# Patient Record
Sex: FEMALE | Race: WHITE
[De-identification: names, ages, dates, MRNs, and addresses within clinical notes are randomized per-mention and may not be internally consistent; named-entity substitution may affect disease eponyms.]

---

## 2017-07-14 ENCOUNTER — HOSPITAL ENCOUNTER (OUTPATIENT)
Dept: HOSPITAL 62 - RAD | Age: 32
End: 2017-07-14
Attending: PHYSICIAN ASSISTANT
Payer: OTHER GOVERNMENT

## 2017-07-14 DIAGNOSIS — R10.10: Primary | ICD-10-CM

## 2017-07-14 PROCEDURE — 76700 US EXAM ABDOM COMPLETE: CPT

## 2017-07-14 NOTE — RADIOLOGY REPORT (SQ)
EXAM DESCRIPTION:  U/S ABDOMEN COMPLETE W/O DOP



COMPLETED DATE/TIME:  7/14/2017 9:38 am



REASON FOR STUDY:  UPPER ABDOMINAL PAIN R10.10  UPPER ABDOMINAL PAIN, UNSPECIFIED



COMPARISON:  None.



TECHNIQUE:  Dynamic and static grayscale images acquired of the abdomen and recorded on PACS. Additio
mary selected color Doppler and spectral images recorded.



LIMITATIONS:  None.



FINDINGS:  PANCREAS: No masses. Visualized pancreatic duct normal caliber.

LIVER: 13.8 cm.  Normal echotexture.

LIVER VASCULATURE: Normal directional flow of the main portal vein and hepatic veins.

GALLBLADDER: No stones. Normal wall thickness. No pericholecystic fluid.

ULTRASOUND-DETECTED RUTH'S SIGN: Negative.

INTRAHEPATIC DUCTS AND COMMON DUCT: Common bile duct is normal at 1.4 mm.  There is no intrahepatic d
uctal dilatation

INFERIOR VENA CAVA: Normal flow.

AORTA: No aneurysm.

RIGHT KIDNEY:  Normal size, 10.8 cm.   Normal echogenicity.   No solid or suspicious masses.   No hyd
ronephrosis.   No calcifications.

LEFT KIDNEY:  Normal size, 10.6 cm.   Normal echogenicity.   No solid or suspicious masses.   No hydr
onephrosis.   No calcifications.

SPLEEN: Normal size, 8.4 cm.  No masses.

PERITONEAL AND PLEURAL SPACES: No ascites or effusions.

OTHER: No other significant finding.



IMPRESSION:  NORMAL ABDOMINAL ULTRASOUND.



TECHNICAL DOCUMENTATION:  JOB ID:  [id number]

 [employer]- All Rights Reserved

## 2017-10-31 ENCOUNTER — HOSPITAL ENCOUNTER (OUTPATIENT)
Dept: HOSPITAL 62 - RAD | Age: 32
End: 2017-10-31
Attending: PHYSICIAN ASSISTANT
Payer: OTHER GOVERNMENT

## 2017-10-31 DIAGNOSIS — R10.9: Primary | ICD-10-CM

## 2017-10-31 PROCEDURE — A9537 TC99M MEBROFENIN: HCPCS

## 2017-10-31 PROCEDURE — 78227 HEPATOBIL SYST IMAGE W/DRUG: CPT

## 2017-10-31 NOTE — RADIOLOGY REPORT (SQ)
EXAM DESCRIPTION:  NM HIDA SCAN WITH CCK



COMPLETED DATE/TIME:  10/31/2017 3:07 pm



REASON FOR STUDY:  UNSPECIFIED ABDOMINAL PAIN (R10.9) R10.9  UNSPECIFIED ABDOMINAL PAIN



COMPARISON:  None.



RADIONUCLIDE AND DOSE:  DOSAGE RADIONUCLIDE: 5 millicuries Tc99m Mebrofenin.

DOSAGE CCK: 1.1 micrograms.

DOSAGE MORPHINE: Not required.

The route of agent administration: Intravenous



TECHNIQUE:  Serial imaging right upper quadrant up to 60 minutes following injection of radionuclide.
  CCK injected after gallbladder visualized.



LIMITATIONS:  None.



FINDINGS:  LIVER: Normal visualization without areas of photopenia.

INTRAHEPATIC BILE DUCTS: Normal size and no delay in visualization.

COMMON BILE DUCT: Normal without dilatation.

GALLBLADDER:   Normal visualization.  Calculated ejection fraction of 19%. Normal range is greater th
an 35%.

PHYSICAL RESPONSE:  Patients presenting complaint was reproduced.

OTHER: No other significant finding.



IMPRESSION:  Reduce gallbladder ejection fraction of 19% where 35% or greater is considered normal.  
Administration of CCK reproduced the patient's symptoms.



TECHNICAL DOCUMENTATION:  JOB ID:  [id number]

 2011 [employer]- All Rights Reserved

## 2018-10-05 ENCOUNTER — HOSPITAL ENCOUNTER (EMERGENCY)
Dept: HOSPITAL 62 - ER | Age: 33
Discharge: HOME | End: 2018-10-05
Payer: OTHER GOVERNMENT

## 2018-10-05 VITALS — DIASTOLIC BLOOD PRESSURE: 65 MMHG | SYSTOLIC BLOOD PRESSURE: 95 MMHG
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
## 2018-10-05 DIAGNOSIS — S00.251A: Primary | ICD-10-CM
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
## 2018-10-05 PROCEDURE — 90715 TDAP VACCINE 7 YRS/> IM: CPT

## 2018-10-05 PROCEDURE — 99283 EMERGENCY DEPT VISIT LOW MDM: CPT

## 2018-10-05 PROCEDURE — 90471 IMMUNIZATION ADMIN: CPT

## 2018-10-05 PROCEDURE — 10120 INC&RMVL FB SUBQ TISS SMPL: CPT

## 2018-10-05 NOTE — ER DOCUMENT REPORT
ED General





- General


Chief Complaint: Foreign Body


Stated Complaint: FISHHOOK IN EYEBROW


Time Seen by Provider: 10/05/18 19:08


Notes: 





Patient is a 33-year-old female who presents with a fishhook to the left 

eyebrow.  Her splint was freshwater fishing and when he was casting had 

punctured her eyebrow.  Her last tetanus shot was 6 years ago.


TRAVEL OUTSIDE OF THE [place] IN LAST 30 DAYS: No





- Related Data


Allergies/Adverse Reactions: 


 





No Known Allergies Allergy (Verified 09/25/17 16:00)


 











Past Medical History





- General


Information source: Patient





- Social History


Smoking Status: Never Smoker


Chew tobacco use (# tins/day): No


Frequency of alcohol use: Occasional


Drug Abuse: None


Family History: Reviewed & Not Pertinent


Patient has suicidal ideation: No


Patient has homicidal ideation: No





- Past Medical History


Cardiac Medical History: 


   Denies: Hx Coronary Artery Disease, Hx Heart Attack, Hx Hypertension


Pulmonary Medical History: Reports: Hx Bronchitis


   Denies: Hx Asthma, Hx COPD, Hx Pneumonia


Neurological Medical History: Denies: Hx Cerebrovascular Accident, Hx Seizures


Renal/ Medical History: Denies: Hx Peritoneal Dialysis


Musculoskeletal Medical History: Denies Hx Arthritis


Past Surgical History: Denies: Hx Hysterectomy





Review of Systems





- Review of Systems


Notes: 





Constitutional: Negative for fever.


HENT: Positive for pain at left eyebrow, with fishhook and eyebrow


Eyes: Negative for visual changes.


Cardiovascular: Negative for chest pain.


Respiratory: Negative for shortness of breath.


Gastrointestinal: Negative for abdominal pain, vomiting or diarrhea.


Genitourinary: Negative for dysuria.


Musculoskeletal: Negative for back pain.


Skin: Negative for rash.


Neurological: Negative for headaches, weakness or numbness.





10 point ROS negative except as marked above and in HPI.





Physical Exam





- Vital signs


Vitals: 


 











Temp Pulse Resp BP Pulse Ox


 


 98.6 F   69   18   99/67 L  100 


 


 10/05/18 18:30  10/05/18 18:30  10/05/18 18:30  10/05/18 18:30  10/05/18 18:30














- Notes


Notes: 





PHYSICAL EXAMINATION:





GENERAL: Well-appearing, well-nourished and in no acute distress.





HEAD: Atraumatic, normocephalic, fishhook in the left eyebrow.





EYES: Pupils equal round and reactive to light, extraocular movements intact, 

sclera anicteric, conjunctiva are normal.





ENT: nares patent, oropharynx clear without exudates.  Moist mucous membranes.





NECK: Normal range of motion, supple without lymphadenopathy





LUNGS: Breath sounds clear to auscultation bilaterally and equal.  No wheezes 

rales or rhonchi.





HEART: Regular rate and rhythm without murmurs





ABDOMEN: Soft, nontender, normoactive bowel sounds.  No guarding, no rebound.  

No masses appreciated.





EXTREMITIES: Normal range of motion, no pitting or edema.  No cyanosis.





NEUROLOGICAL: No focal neurological deficits. Moves all extremities 

spontaneously and on command.





PSYCH: Normal mood, normal affect.





SKIN: Warm, Dry, normal turgor, no rashes or lesions noted. 





Course





- Re-evaluation


Re-evalutation: 





10/05/18 2002 


Patient has a fishhook to left eyebrow, the hook is not exposed. 


10/05/18 21:08


[place] was removed by TANGELA Larry.  Hook was advanced to expose the tip, 

then the hook was cut with pliers.  The hook was then removed.





- Vital Signs


Vital signs: 


 











Temp Pulse Resp BP Pulse Ox


 


 98.6 F   69   18   99/67 L  100 


 


 10/05/18 18:30  10/05/18 18:30  10/05/18 18:30  10/05/18 18:30  10/05/18 18:30














Procedures





- Incision and Drainage


  ** [place] removal of left eyebrow


Type: Simple


Anesthetic type: 1% Lidocaine w/epi


mL's of anesthetic: 2


I&D procedure: Chlorprep applied, Shurclens applied


Incision Method: Incision made with needle - Fish hook was advanced to expose 

the tip, then pliers were used to cut the tip.  The hook was then pulled out.





Discharge





- Discharge


Clinical Impression: 


 Foreign body (FB) in soft tissue





Condition: Stable


Disposition: HOME, SELF-CARE


Additional Instructions: 


You have been seen in the emergency department for a fish hook removal to left 

eyebrow.  You may see at the site of the fishhook removal.  Keep the area clean 

and dry.  You may clean the area as needed with soap and water.  In addition, 

you have been prescribed antibiotics.  Please finish your antibiotics as 

prescribed.  If you feel the site is getting worse, develop a fever, see some 

increased redness, or have any concerns that are worrisome to you, please 

return to the emergency department or visit your primary care provider.


Prescriptions: 


Doxycycline Monohydrate 100 mg PO BID #14 tablet


Referrals: 


AUTUMN HENLEY PA-C [Primary Care Provider] - Follow up as needed

## 2018-10-27 ENCOUNTER — HOSPITAL ENCOUNTER (OUTPATIENT)
Dept: HOSPITAL 62 - OD | Age: 33
End: 2018-10-27
Attending: NURSE PRACTITIONER
Payer: OTHER GOVERNMENT

## 2018-10-27 DIAGNOSIS — N91.2: Primary | ICD-10-CM

## 2018-10-27 PROCEDURE — 36415 COLL VENOUS BLD VENIPUNCTURE: CPT

## 2018-10-27 PROCEDURE — 84703 CHORIONIC GONADOTROPIN ASSAY: CPT

## 2018-11-30 ENCOUNTER — HOSPITAL ENCOUNTER (OUTPATIENT)
Dept: HOSPITAL 62 - RAD | Age: 33
End: 2018-11-30
Attending: PHYSICIAN ASSISTANT
Payer: OTHER GOVERNMENT

## 2018-11-30 DIAGNOSIS — R10.2: Primary | ICD-10-CM

## 2018-11-30 PROCEDURE — 76830 TRANSVAGINAL US NON-OB: CPT

## 2018-11-30 NOTE — RADIOLOGY REPORT (SQ)
EXAM DESCRIPTION:  U/S NON-OB PELVIS TV W/O DOP



COMPLETED DATE/TIME:  11/30/2018 9:17 am



REASON FOR STUDY:  PELVIC PAIN IN FEMALE. (R10.2) R10.2  PELVIC AND PERINEAL PAIN



COMPARISON:  None.



TECHNIQUE:  Dynamic and static grayscale images acquired of the pelvis via transvaginal approach and 
recorded on PACS. Additional selected color Doppler and spectral images recorded.



LIMITATIONS:  None.



FINDINGS:  UTERUS: Contour normal.  No mass.

ENDOMETRIAL STRIPE: No focal or generalized thickening. No masses.  There is a small amount of fluid 
noted within the endometrial canal.

CERVIX: Small nabothian cysts are demonstrated.

RIGHT OVARY AND DOPPLER: There is a 1.7 x 2.1 x 1.2 cm complex ovarian cyst.  Normal intravascular fl
ow.

LEFT OVARY AND DOPPLER: Normal size. No worrisome masses. Normal arterial vascular flow without evide
nce for torsion.

FREE FLUID: None noted.

OTHER: No other significant finding.

MEASUREMENTS:

UTERUS: 7.1 x 5.4 x 3.5 cm.

ENDOMETRIAL STRIPE: 10 mm.

RIGHT OVARY: 3.7 x 3.9 x 1.7 cm.

LEFT OVARY: 3.3 x 2.7 x 1.7 cm.



IMPRESSION:  Small complex right ovarian lesion measured 1.7 x 2.1 x 1.2 cm.  No free fluid.  No othe
r significant findings.



TECHNICAL DOCUMENTATION:  JOB ID:  [id number]

 2011 [employer]- All Rights Reserved                          Rev-5/18



Reading location - IP/workstation name: [email or web address]

## 2023-01-24 ENCOUNTER — OFFICE VISIT (OUTPATIENT)
Dept: FAMILY MEDICINE CLINIC | Age: 38
End: 2023-01-24
Payer: OTHER GOVERNMENT

## 2023-01-24 VITALS
SYSTOLIC BLOOD PRESSURE: 104 MMHG | RESPIRATION RATE: 18 BRPM | HEIGHT: 62 IN | TEMPERATURE: 97.6 F | WEIGHT: 150 LBS | BODY MASS INDEX: 27.6 KG/M2 | HEART RATE: 78 BPM | OXYGEN SATURATION: 99 % | DIASTOLIC BLOOD PRESSURE: 70 MMHG

## 2023-01-24 DIAGNOSIS — F41.9 ANXIETY: ICD-10-CM

## 2023-01-24 DIAGNOSIS — F32.A DEPRESSION, UNSPECIFIED DEPRESSION TYPE: ICD-10-CM

## 2023-01-24 DIAGNOSIS — Z76.89 ENCOUNTER TO ESTABLISH CARE: ICD-10-CM

## 2023-01-24 DIAGNOSIS — G43.909 MIGRAINE WITHOUT STATUS MIGRAINOSUS, NOT INTRACTABLE, UNSPECIFIED MIGRAINE TYPE: Primary | ICD-10-CM

## 2023-01-24 DIAGNOSIS — R55 VASOVAGAL EPISODE: ICD-10-CM

## 2023-01-24 DIAGNOSIS — G90.A POTS (POSTURAL ORTHOSTATIC TACHYCARDIA SYNDROME): ICD-10-CM

## 2023-01-24 PROCEDURE — 1220F PT SCREENED FOR DEPRESSION: CPT | Performed by: STUDENT IN AN ORGANIZED HEALTH CARE EDUCATION/TRAINING PROGRAM

## 2023-01-24 PROCEDURE — 99204 OFFICE O/P NEW MOD 45 MIN: CPT | Performed by: STUDENT IN AN ORGANIZED HEALTH CARE EDUCATION/TRAINING PROGRAM

## 2023-01-24 RX ORDER — ONDANSETRON 4 MG/1
4 TABLET, FILM COATED ORAL
COMMUNITY

## 2023-01-24 RX ORDER — FLUTICASONE PROPIONATE 50 MCG
2 SPRAY, SUSPENSION (ML) NASAL AS NEEDED
COMMUNITY
Start: 2022-10-19

## 2023-01-24 RX ORDER — TOPIRAMATE 50 MG/1
50 TABLET, FILM COATED ORAL DAILY
COMMUNITY
Start: 2022-12-16

## 2023-01-24 RX ORDER — AMITRIPTYLINE HYDROCHLORIDE 10 MG/1
10 TABLET, FILM COATED ORAL
COMMUNITY

## 2023-01-24 RX ORDER — METOPROLOL SUCCINATE 25 MG/1
25 TABLET, EXTENDED RELEASE ORAL DAILY
Qty: 90 TABLET | Refills: 1 | Status: SHIPPED | OUTPATIENT
Start: 2023-01-24

## 2023-01-24 RX ORDER — BUPROPION HYDROCHLORIDE 300 MG/1
300 TABLET ORAL DAILY
Qty: 90 TABLET | Refills: 1 | Status: SHIPPED | OUTPATIENT
Start: 2023-01-24

## 2023-01-24 RX ORDER — BUPROPION HYDROCHLORIDE 300 MG/1
300 TABLET ORAL DAILY
COMMUNITY
Start: 2023-01-12 | End: 2023-01-24 | Stop reason: SDUPTHER

## 2023-01-24 RX ORDER — METOPROLOL SUCCINATE 25 MG/1
25 TABLET, EXTENDED RELEASE ORAL DAILY
COMMUNITY
Start: 2022-12-21 | End: 2023-01-24 | Stop reason: SDUPTHER

## 2023-01-24 RX ORDER — MULTIVITAMIN
1 TABLET ORAL DAILY
COMMUNITY

## 2023-01-24 RX ORDER — TOPIRAMATE 100 MG/1
100 TABLET, FILM COATED ORAL DAILY
COMMUNITY
Start: 2022-11-09

## 2023-01-24 NOTE — PROGRESS NOTES
1. \"Have you been to the ER, urgent care clinic since your last visit? Hospitalized since your last visit? \" No    2. \"Have you seen or consulted any other health care providers outside of the [address] [place] [address] since your last visit? \" No     3. For patients aged 39-70: Has the patient had a colonoscopy / FIT/ Cologuard? NA - based on age      If the patient is female:    4. For patients aged 41-77: Has the patient had a mammogram within the past 2 years? NA - based on age or sex      11. For patients aged 21-65: Has the patient had a pap smear? Yes - Care Gap present.  Rooming MA/LPN to request most recent results previous PCP  [facility] - [place], [place]     Health Maintenance Due   Topic Date Due    Hepatitis C Screening  Never done    Depression Screen  Never done    COVID-19 Vaccine (1) Never done    DTaP/Tdap/Td series (1 - Tdap) Never done    Cervical cancer screen  Never done    Flu Vaccine (1) Never done

## 2023-01-24 NOTE — PROGRESS NOTES
Note written with assistance of voice recognition software. Chief Complaint   Patient presents with    Establish Care       History of Present Illness:  Dennis Devi is a 42 y.o. female w/ PMHx of migraines, POTS, vaso-vagal syncope, depression who presents to clinic to establish care. - Recently moved to [facility] from [facility]. Migraines: Previously followed with neurology. Would like a referral to a local neurologist. Reports that she gets migraine headaches about 3 times per week. Ran out of [employer] (seemed to be helping). - Takes Topamax.  - Reports receives yearly MRI of the head and spine to monitor multiple lesions of uncertain etiology. - Reports has some chronic back pain and paresthesias. - No fevers, occasional chronic urinary incontinence (hx 3 vaginal deliveries), no stool incontinence. No saddle anesthesia. POTS/vaso-vagal syncope: Diagnosed years ago (~2018). Improved metoprolol, self monitoring, adequate hydration. No longer followed by Cardiology. Takes metoprolol 25 mg daily.    - Had PAP 3 yrs ago per pt (performed by prior PCP). - Had mammogram last year per pt (demonstrated dense tissue). Depression: Pt takes Wellbutrin 300 mg XL daily and Elavil 10 mg QHS PRN. PHQ-9 score 11. No SI.  - Reports symptoms same as prior to moving to area. - Recently graduated from grad school.  - Currently at home taking care of 3 kids. 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 - No hx seizures. Past Medical History:   Diagnosis Date    Anemia     Anemic during all my pregnancies and then when I got sick and started passing out my white blood counts were high and i was sent to hematology to have my levels checked. I had to receive IV iron for months until my levels were normal again. Autoimmune disease ([facility] [address]     Chronic pain     Depression     More anxiety than depressionI do not like to be in crowds of people or like changes.  I am bery particular about things and have to do things a certain way. But, I am also so tired all of the time. Headache     POTS (postural orthostatic tachycardia syndrome)            No Known Allergies    Social History     Tobacco Use    Smoking status: Former     Packs/day: 0.25     Years: 9.00     Pack years: 2.25     Types: Cigarettes     Quit date:      Years since quittin.0     Passive exposure: Current    Smokeless tobacco: Never    Tobacco comments:     Light smoker   Substance Use Topics    Alcohol use: Yes     Alcohol/week: 2.0 standard drinks     Types: 1 Glasses of wine, 1 Standard drinks or equivalent per week     Comment: Maybe 1-2 drinks per week with dinner on the weekend. Drug use: Never       Family History   Problem Relation Age of Onset    Hypertension Mother     Migraines Mother     Hypertension Father     Cancer Maternal Grandfather         Prostate Cancer    Cancer Maternal Grandmother         Non hodgkins lymphoma/lung cancer    Anemia Maternal Grandmother     Cancer Paternal Grandmother         Lung Cancer       Physical Exam:     Visit Vitals  /70 (BP 1 Location: Left upper arm, BP Patient Position: Sitting, BP Cuff Size: Adult)   Pulse 78   Temp 97.6 °F (36.4 °C) (Oral)   Resp 18   Ht 5' 2\" (1.575 m)   Wt 150 lb (68 kg)   SpO2 99%   BMI 27.44 kg/m²       Physical Examination:  General: NAD  CV: Heart: Regular rate and rhythm. Normal S1 and S2. No murmurs. Resp: Breathing comfortably on room air. Good air movement. No wheezing. Neuro: Awake, alert, and appropriately conversant. Cranial nerves II to XII grossly intact. Motor strength and sensation grossly intact throughout. Assessment/Plan:    Diagnoses and all orders for this visit:    1. Migraine without status migrainosus, not intractable, unspecified migraine type: Chronic per history. Previously followed with neurology prior to moving to [place] 1 month ago. She reports experiencing up to 3 migraines per week. She takes Topamax daily.   She desires to establish care with a neurologist in this area. Provided referral (prefers Nicole saunders). -     REFERRAL TO NEUROLOGY    2. Depression and anxiety: Chronic. PHQ-9 score 11 today. Denies SI. Currently taking Wellbutrin 300 mg daily and Elavil 10 mg nightly as needed. Patient states that she recently moved to the area 1 month ago and is no longer working as a . She denies worsening depression, stating \"I feel the same. \"  No hx of seizures. Extensively discussed pharmacotherapy including continuing Wellbutrin or switching to a different antidepressant. Also recommended establishing care with a therapist in the area. After discussion and shared decision making, patient elected to continue Wellbutrin 300 mg daily and Elavil as needed at bedtime. Provided referral to psychology (prefers Nicole saunders). Recommended follow-up in 1 month. Encourage patient to seek medical attention immediately if there are any changes in the meantime.  -     REFERRAL TO PSYCHOLOGY  -     LA PATIENT SCREENED DEPRESSION  -     buPROPion XL (WELLBUTRIN XL) 300 mg XL tablet; Take 1 Tablet by mouth daily. 3. POTS (postural orthostatic tachycardia syndrome): Chronic, controlled with metoprolol 25 mg daily. No longer follows with cardiology. Provided refill of patient's metoprolol today. -     metoprolol succinate (TOPROL-XL) 25 mg XL tablet; Take 1 Tablet by mouth daily. 4. Vasovagal episode: Reports history of multiple vasovagal syncopal episodes in the past.  Improved. No longer follows with cardiology. Continue to promote lifestyle interventions, including adequate hydration, etc.    5. Encounter to establish care: Reviewed and updated PMHx, PSHx, Medications, Allergies, Soc Hx, Fam Hx. Records request form completed. Will request records from prior PCP. Declined flu shot. Follow-up and Dispositions    Return in about 1 month (around 2/24/2023) for follow-up anxiety/depression.          Lucy Bowman Josesito Barone expressed understanding of this plan. An AVS was printed and given to the patient. Pt discussed with Dr. Daren Salazar (Attending Physician),    Rosa Dillard MD  Family Medicine Resident    Please note that this dictation was completed with [employer], the computer voice recognition software. Quite often unanticipated grammatical, syntax, homophones, and other interpretive errors are inadvertently transcribed by the computer software. Please disregard these errors. Please excuse any errors that have escaped final proofreading.

## 2023-01-26 ENCOUNTER — PATIENT MESSAGE (OUTPATIENT)
Dept: FAMILY MEDICINE CLINIC | Age: 38
End: 2023-01-26

## 2023-02-16 ENCOUNTER — PATIENT MESSAGE (OUTPATIENT)
Dept: FAMILY MEDICINE CLINIC | Age: 38
End: 2023-02-16

## 2023-02-23 ENCOUNTER — PATIENT MESSAGE (OUTPATIENT)
Dept: FAMILY MEDICINE CLINIC | Age: 38
End: 2023-02-23

## 2023-02-26 NOTE — PROGRESS NOTES
CC: Follow-up. History of Present Illness:  Evens Laurent is a 42 y.o. female w/ PMHx of migraines, POTS, vaso-vagal syncope, depression who presents to clinic for    - Recently moved to area from [place]. Brain lesion of uncertain etiology: Previously followed with neurology in [place].    - Last MRI brain with and without contrast obtained on 10/10/2022, which showed \"no acute findings or significant change from previous study (3/2/2018). Grossly subtle small nonenhancing T2/FLAIR hyperintense cortical lesion inferior left temporal occipital region. Chronic stability (just over 4 years) is certainly reassuring, although, a low-grade neoplasm again cannot be excluded. Continued surveillance imaging in 1-2 years recommended. \"  - Pt reports that she has similar-appearing lesions in her spinal cord. Has been working to get CD of her spinal MRI. - Referred to neurologist at last visit. - Has not been able to get in with Neurologist.     Aurelia Sewell: Previously followed with neurology. Referred to neurologist at last visit. Reports that she gets migraine headaches about 3 times per week. Ran out of Aimovig (seemed to be helping). No history of seizures. - Reports symptoms once per week. - Takes Topamax.  - Takes Nurtec PRN (typically takes once a month). - Aimovig monthly seemed to help the most.  - Needs to see a neurologist to get Grace Billing approved. Chronic back pain and paresthesias. - Pain has been exacerbated this week. Pain located lower T spine, upper L spine. Pain sometimes radiates to hip. Reports intermittent tingling in arms/legs. Reports some increased tripping, clumsiness. No saddle anaesthesia, no bowel incontinence, no fevers, no trauma, no personal hx of cancer.  - Takes Tylenol PRN, ibuprofen PRN. - Has been doing stretching exercises. - Reports she had an MRI of her spine that showed bulging discs and spinal cord lesions. POTS/vaso-vagal syncope: Diagnosed years ago (~2018).  Improved metoprolol, self monitoring, adequate hydration. No longer followed by Cardiology. Takes metoprolol 25 mg daily.     - Had PAP 3 yrs ago per pt (performed by prior PCP). - Had mammogram last year per pt (demonstrated dense tissue). Depression: Pt takes Wellbutrin 300 mg XL daily and Elavil 10 mg QHS PRN. PHQ-9 score 8. No SI.  SAIRA-7 score 7.  - Reports symptoms same as prior to moving to area. - Recently graduated from grad school.  - Currently at home taking care of 3 kids. 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 - Has contacted office in [place]. Has not had appointment yet. - Pt reports home life is good and her kids have been doing well. Past Medical History:   Diagnosis Date    Anemia     Anemic during all my pregnancies and then when I got sick and started passing out my white blood counts were high and i was sent to hematology to have my levels checked. I had to receive IV iron for months until my levels were normal again. Autoimmune disease ([facility] [address]     Chronic pain     Depression     More anxiety than depressionI do not like to be in crowds of people or like changes. I am bery particular about things and have to do things a certain way. But, I am also so tired all of the time. Headache     POTS (postural orthostatic tachycardia syndrome)        Current Outpatient Medications   Medication Sig Dispense Refill    fluticasone propionate (FLONASE) 50 mcg/actuation nasal spray 2 Sprays by Both Nostrils route as needed. ondansetron hcl (ZOFRAN) 4 mg tablet Take 4 mg by mouth every eight (8) hours as needed. topiramate (TOPAMAX) 100 mg tablet Take 100 mg by mouth daily. topiramate (TOPAMAX) 50 mg tablet Take 50 mg by mouth daily. Take along with 100mg tablet to equal 150mg      amitriptyline (ELAVIL) 10 mg tablet Take 10 mg by mouth nightly as needed for Sleep.      multivitamin (ONE A DAY) tablet Take 1 Tablet by mouth daily.       metoprolol succinate (TOPROL-XL) 25 mg XL tablet Take 1 Tablet by mouth daily. 90 Tablet 1    buPROPion XL (WELLBUTRIN XL) 300 mg XL tablet Take 1 Tablet by mouth daily. 90 Tablet 1       No Known Allergies    Social History     Tobacco Use    Smoking status: Former     Packs/day: 0.25     Years: 9.00     Pack years: 2.25     Types: Cigarettes     Quit date:      Years since quittin.1     Passive exposure: Current    Smokeless tobacco: Never    Tobacco comments:     Light smoker   Substance Use Topics    Alcohol use: Yes     Alcohol/week: 2.0 standard drinks     Types: 1 Glasses of wine, 1 Standard drinks or equivalent per week     Comment: Maybe 1-2 drinks per week with dinner on the weekend. Drug use: Never       Family History   Problem Relation Age of Onset    Hypertension Mother     Migraines Mother     Hypertension Father     Cancer Maternal Grandfather         Prostate Cancer    Cancer Maternal Grandmother         Non hodgkins lymphoma/lung cancer    Anemia Maternal Grandmother     Cancer Paternal Grandmother         Lung Cancer       Physical Exam:     Visit Vitals  BP 95/64   Pulse 70   Temp 98.2 °F (36.8 °C)   Resp 16   Ht 5' 2\" (1.575 m)   Wt 148 lb (67.1 kg)   SpO2 100%   BMI 27.07 kg/m²       Physical Examination:  General: NAD  CV: Heart: Regular rate. Resp: Breathing comfortably on room air. Neuro: Awake, alert, and appropriately conversant. Cranial nerves II to XII intact. Motor strength 5/5 bilateral upper and lower extremities. Sensation grossly intact. Finger-to-nose normal bilaterally. Assessment/Plan:    Diagnoses and all orders for this visit:    1. Brain lesion: Patient previously followed with neurologist in [place]. She reports that she has not been given a definitive diagnosis. Last MRI brain with and without contrast obtained on 10/10/2022, which showed \"no acute findings or significant change from previous study (3/2/2018).   Grossly subtle small nonenhancing T2/FLAIR hyperintense cortical lesion inferior left temporal occipital region. Chronic stability (just over 4 years) is certainly reassuring, although, a low-grade neoplasm again cannot be excluded. Continued surveillance imaging in 1-2 years recommended. \"  Patient also reports that she had an MRI of her spine that demonstrated multiple spinal lesions as well. She states that she will provide a CD of these results. Provided referral to neurology on 1/20/2023. Patient reports that she has been unable to schedule an appointment to date. I sent an internal message to Dr. Shiv Rivas today to help facilitate process of scheduling appointment with neurologist.  Neurological exam is reassuring. Recommended follow-up in 3 months. 2. Depression, unspecified depression type: Chronic. Takes Wellbutrin 300 mg XL daily and Elavil 10 mg nightly as needed. PHQ-9 score 8, SAIRA-7 score 7 today. No SI.  - Recommended continuing current medication regimen.  - Recommended scheduling appointment with therapist in [place]. 3. Other migraine without status migrainosus, not intractable: Chronic. Currently taking Topamax. Uses Nurtec about once per month. Melissa Escamilla had helped in the past, but she has been unable to get this medication as she currently does not have a neurologist in the area. Attempted to facilitate establishing care with neurology as described above. Neurological exam reassuring. Recommended follow-up in 3 months. Follow-up and Dispositions    Return in about 3 months (around 5/28/2023) for follow-up chronic conditions. Angela Byrne expressed understanding of this plan. An AVS was printed and given to the patient. Pt discussed with Dr. Nancy Boston (Attending Physician),    Sumaya Gruber MD  Family Medicine Resident    Please note that this dictation was completed with [employer], the [employer] voice recognition software.   Quite often unanticipated grammatical, syntax, homophones, and other interpretive errors are inadvertently transcribed by the computer software. Please disregard these errors. Please excuse any errors that have escaped final proofreading.

## 2023-02-28 ENCOUNTER — OFFICE VISIT (OUTPATIENT)
Dept: FAMILY MEDICINE CLINIC | Age: 38
End: 2023-02-28
Payer: OTHER GOVERNMENT

## 2023-02-28 VITALS
WEIGHT: 148 LBS | TEMPERATURE: 98.2 F | OXYGEN SATURATION: 100 % | SYSTOLIC BLOOD PRESSURE: 95 MMHG | HEIGHT: 62 IN | BODY MASS INDEX: 27.23 KG/M2 | DIASTOLIC BLOOD PRESSURE: 64 MMHG | RESPIRATION RATE: 16 BRPM | HEART RATE: 70 BPM

## 2023-02-28 DIAGNOSIS — G43.809 OTHER MIGRAINE WITHOUT STATUS MIGRAINOSUS, NOT INTRACTABLE: ICD-10-CM

## 2023-02-28 DIAGNOSIS — G93.9 BRAIN LESION: Primary | ICD-10-CM

## 2023-02-28 DIAGNOSIS — F32.A DEPRESSION, UNSPECIFIED DEPRESSION TYPE: ICD-10-CM

## 2023-02-28 PROCEDURE — 99214 OFFICE O/P EST MOD 30 MIN: CPT | Performed by: STUDENT IN AN ORGANIZED HEALTH CARE EDUCATION/TRAINING PROGRAM

## 2023-02-28 NOTE — PROGRESS NOTES
1. Have you been to the ER, urgent care clinic since your last visit? Hospitalized since your last visit? No    2. Have you seen or consulted any other health care providers outside of the [address] [place] [address] since your last visit? Include any pap smears or colon screening. No  Reviewed record in preparation for visit and have necessary documentation  Pt did not bring medication to office visit for review  opportunity was given for questions      3. For patients aged 39-70: Has the patient had a colonoscopy / FIT/ Cologuard? NA - based on age      If the patient is female:    4. For patients aged 41-77: Has the patient had a mammogram within the past 2 years? NA - based on age or sex      11. For patients aged 21-65: Has the patient had a pap smear?  Yes - no Care Gap present      Goals that were addressed and/or need to be completed during or after this appointment include   Health Maintenance Due   Topic Date Due    Hepatitis C Screening  Never done    COVID-19 Vaccine (1) Never done    DTaP/Tdap/Td series (1 - Tdap) Never done    Flu Vaccine (1) Never done

## 2023-03-13 ENCOUNTER — TELEPHONE (OUTPATIENT)
Dept: FAMILY MEDICINE CLINIC | Age: 38
End: 2023-03-13

## 2023-03-13 NOTE — TELEPHONE ENCOUNTER
Called patient to request that she obtain a copy of her recent brain and spine MRI on a disc and bring them to her upcoming neurology appointment. Also recommend patient sign release of information to get records from her prior neurologist (last 3 clinic notes if available). No answer. LVM with instructions to call office back. Memeoirst message sent.

## 2023-03-22 ENCOUNTER — OFFICE VISIT (OUTPATIENT)
Dept: NEUROLOGY | Age: 38
End: 2023-03-22
Payer: OTHER GOVERNMENT

## 2023-03-22 VITALS
SYSTOLIC BLOOD PRESSURE: 122 MMHG | TEMPERATURE: 97.7 F | RESPIRATION RATE: 14 BRPM | WEIGHT: 148 LBS | DIASTOLIC BLOOD PRESSURE: 70 MMHG | BODY MASS INDEX: 27.23 KG/M2 | HEIGHT: 62 IN | OXYGEN SATURATION: 98 % | HEART RATE: 64 BPM

## 2023-03-22 DIAGNOSIS — G93.9 WHITE MATTER LESION OF CENTRAL NERVOUS SYSTEM: Primary | ICD-10-CM

## 2023-03-22 DIAGNOSIS — R93.7 ABNORMAL MRI, CERVICAL SPINE: ICD-10-CM

## 2023-03-22 DIAGNOSIS — G89.29 CHRONIC BILATERAL LOW BACK PAIN, UNSPECIFIED WHETHER SCIATICA PRESENT: ICD-10-CM

## 2023-03-22 DIAGNOSIS — M54.50 CHRONIC BILATERAL LOW BACK PAIN, UNSPECIFIED WHETHER SCIATICA PRESENT: ICD-10-CM

## 2023-03-22 DIAGNOSIS — R93.7 ABNORMAL MRI, THORACIC SPINE: ICD-10-CM

## 2023-03-22 DIAGNOSIS — M51.36 DDD (DEGENERATIVE DISC DISEASE), LUMBAR: ICD-10-CM

## 2023-03-22 DIAGNOSIS — G43.709 CHRONIC MIGRAINE WITHOUT AURA WITHOUT STATUS MIGRAINOSUS, NOT INTRACTABLE: ICD-10-CM

## 2023-03-22 DIAGNOSIS — G90.A POTS (POSTURAL ORTHOSTATIC TACHYCARDIA SYNDROME): ICD-10-CM

## 2023-03-22 PROCEDURE — 99205 OFFICE O/P NEW HI 60 MIN: CPT | Performed by: PSYCHIATRY & NEUROLOGY

## 2023-03-22 RX ORDER — ERENUMAB-AOOE 70 MG/ML
70 INJECTION SUBCUTANEOUS ONCE
Qty: 1 EACH | Refills: 5 | Status: SHIPPED | OUTPATIENT
Start: 2023-03-22 | End: 2023-03-22

## 2023-03-22 NOTE — PROGRESS NOTES
Jesus Shone (1985) is a 42 y.o. female, new patient, here for evaluation of the following     Chief complaint(s):   Chief Complaint   Patient presents with    New Patient    Migraine     Patient presents today for long history of migraines, referred by her PCP, Dr Mary Lou Mcghee. HPI: 42 y.o. female,  has a past medical history of Anemia, Autoimmune disease ([address] Chronic pain, Depression, Headache, and POTS (postural orthostatic tachycardia syndrome). Pt is referred to establish care regarding chronic migraine    She also reports she has a hx of white matter lesion(s) in Brain that she'd like to discuss    Reviewed New Patient Visit note by PCP dated 1-: Jesus Shone is a 42 y.o. female w/ PMHx of migraines, POTS, vaso-vagal syncope, depression who presents to clinic to establish care. - Recently moved to area from [facility]. Migraines: Previously followed with neurology. Would like a referral to a local neurologist. Reports that she gets migraine headaches about 3 times per week. Ran out of [facility] [employer] (seemed to be helping). - Takes Topamax.  - Reports receives yearly MRI of the head and spine to monitor multiple lesions of uncertain etiology. - Reports has some chronic back pain and paresthesias. - No fevers, occasional chronic urinary incontinence (hx 3 vaginal deliveries), no stool incontinence. No saddle anesthesia. POTS/vaso-vagal syncope: Diagnosed years ago (~2018). Improved metoprolol, self monitoring, adequate hydration. No longer followed by Cardiology. Takes metoprolol 25 mg daily. 1) White matter lesions: pt says that she had initial Brain MRI in early teenage years when started having migraine. As far as she knows that MRI was normal.  Around 4 yrs ago, she she established care with a Neurologist in [facility] (a [facility], in [facility]) and they ordered Brain MRI due to her Migraines. She says that MRI showed some white matter spots.    Later she stated having low back pain, numbness and tingling in fingers. Had EMG of arms, says that she \"failed\" that test (not clear results were). Says it was about time for her to have repeat Brain MRI so Neurologist added entire spine MRI as well, all of which was done at [facility] (due to her insurance/ ) around Sept-Oct 2020. Says she was told that there were bulging discs in her lower back, one white matter lesion in brain, and she believes she was told she had lesions in her spine (not clear to me whether these were spinal cord lesions or bony lesions of the vertebrae). I reviewed a clinic note dated 9- by Dr Coby Villalba: He commented that the Brain MRI findings were were probably congenital in nature but planned to repeat the Brain MRI before she moved to [address] [place] [address] Pt had Brain MRI repeated in Oct 2022. I reviewed that report. A summary of the findings is as follows: Brain MRI with and without contrast dated 10/10/2022: No significant interval change in the small T2/FLAIR hyperintense cortical lesion inferior left temporal occipital region again no enhancement or significant mass effect. No new or additional lesions. Grossly stable small nonenhancing T2 flair hyperintense cortical lesion in the inferior left temporal occipital region. Chronic stability (just over 4 years) is reassuring, although a low-grade neoplasm again cannot be excluded. Continued surveillance imaging in 1 to 2 years is recommended.       She reports the following symptoms:  Chronic urinary, frequency, and urinary leak  Numbness/ tingling in hands and feet  Numbness extending up to calves  Gait changes, tripping on toes, legs feels heavy at times  Reduced  strength, can't hold onto things for extended times  Shooting pains in hands up to elbows if gripping for too long  Blurry vision in either eye; feels like has to rub eyes  Had dilate eye exam in Aug 2022, normal findings  Episodic word-finding difficulty or mixes up words  Frequently can't remember recent conversations  Has significant low back pain, painful to stand upright, takes few seconds  Swelling of feet/ lower legs, hands too  Dizzy, unsteady gait  Questions whether Cognitive difficulty is due Topamax    Denies any episodes of prolonged extremity weakness, slurred speech, facial droop, visual loss on either side, severe pain in either eye, has frequent dry mouth (on Amitriptyline, takes 1-2 days a week). No family history of Multiple Sclerosis. Never had Lumbar Puncture    Does not recall any lab workup regarding the white matter spot in brain or spinal cord (multiple spots per pt recollection)      2) Chronic migraine    Describes migraine as: pain behind eyes and in temples, radiates to back of head and into neck, severe pressure, +nausea, rare vomiting, + light / sound sensitivity. No specific visual symptoms with migraine. Typical duration: 2-3 days. Normally takes Ibuprofen or tylenol. Has Nurtec, but avoids taking it due to it causing he to be drowsy. It does eliminate her migraine. Botox was working for her but 1 weeks before next round headaches would increase. Switched to Aimovig SQ injector and says that reduced her headache frequency better than botox. Ran out of [employer] in Nov 2022, since she moved.      # of headache days a week: 15   # of migraine days a month: 9-12    Tried/ failed: Imitrex tablet (made her feel like having heart attack)    Blood pressure is controlled, typically runs low due to POTS  No personal hx of heart attack, TIA or stroke  No personal hx of aneurysm       Review of Systems: Abnormal weight loss or weight gain, anxiety, leg swelling, frequent headaches, memory loss, dry skin, shortness of breath, nausea vomiting, syncope, vision problems, muscle pain muscle weakness, falls/tripping, joint pain, fatigue     ==================================================    No Known Allergies    Current Outpatient Medications   Medication Sig Dispense Refill    erenumab-aooe (Aimovig Autoinjector) 70 mg/mL injection 1 mL by SubCUTAneous route once for 1 dose. Indications: migraine prevention 1 Each 5    ubrogepant (Ubrelvy) 100 mg tablet Take one tablet at onset of migraine. Limit to one tablet in 24 hours. Limit use to 2 days per week. 1 Tablet 0    fluticasone propionate (FLONASE) 50 mcg/actuation nasal spray 2 Sprays by Both Nostrils route as needed. ondansetron hcl (ZOFRAN) 4 mg tablet Take 4 mg by mouth every eight (8) hours as needed. topiramate (TOPAMAX) 100 mg tablet Take 100 mg by mouth daily. topiramate (TOPAMAX) 50 mg tablet Take 50 mg by mouth daily. Take along with 100mg tablet to equal 150mg      amitriptyline (ELAVIL) 10 mg tablet Take 10 mg by mouth nightly as needed for Sleep.      multivitamin (ONE A DAY) tablet Take 1 Tablet by mouth daily. metoprolol succinate (TOPROL-XL) 25 mg XL tablet Take 1 Tablet by mouth daily. 90 Tablet 1    buPROPion XL (WELLBUTRIN XL) 300 mg XL tablet Take 1 Tablet by mouth daily. 90 Tablet 1       Past Medical History:   Diagnosis Date    Anemia     Anemic during all my pregnancies and then when I got sick and started passing out my white blood counts were high and i was sent to hematology to have my levels checked. I had to receive IV iron for months until my levels were normal again. Autoimmune disease ([facility] [address]     Chronic pain     Depression     More anxiety than depressionI do not like to be in crowds of people or like changes. I am bery particular about things and have to do things a certain way. But, I am also so tired all of the time.     Headache     POTS (postural orthostatic tachycardia syndrome)        Past Surgical History:   Procedure Laterality Date    HX GYN      Uterine ablation/tubal ligation       family history includes Anemia in her maternal grandmother; Cancer in her maternal grandfather, maternal grandmother, and paternal grandmother; Hypertension in her father and mother; Migraines in her mother. reports that she quit smoking about 11 years ago. Her smoking use included cigarettes. She has a 2.25 pack-year smoking history. She has been exposed to tobacco smoke. She has never used smokeless tobacco. She reports current alcohol use of about 2.0 standard drinks per week. She reports that she does not use drugs. PHYSICAL EXAM    Vitals:    03/22/23 1302   BP: 122/70   Pulse: 64   Temp: 97.7 °F (36.5 °C)   Resp: 14   Height: 5' 2\" (1.575 m)   Weight: 67.1 kg (148 lb)   SpO2: 98%       General: Head: atraumatic. Eyes: Conjunctivae clear. Vascular/ Carotid Arteries: not examined. Extremities: no edema. Skin: no rashes. Neurologic Exam:  Speech/ Language: no aphasia, no dysarthria. Alertness: oriented x 3.  CNs: Smell: not tested. Visual Fields (II): full to confrontation. Pupils (II): equal, round, reactive to light. Funduscopic: no papilledema. Extraocular movements (III, IV, VI): conjugate in all directions, no SONALI. Ptosis (III, VII): none. Facial Sensation (V): intact to LT on both sides. Facial Movements (VII): symmetric at rest and on activation. Hearing (VIII): normal.  Soft palate elevation (IX): symmetric, no droop. Shoulder shrug (XI): symmetric, strong. Tongue protrusion (XII): midline    Motor:  5/5 in all extremities. Sensory: intact LT, pinprick, temp, vibration in all extremities. Cerebellar: no resting, postural, or intention tremor . Deep Tendon Reflexes: 1-2+/ symmetric, throughout. Plantar response: not tested. Gait: normal .  Romberg: negative      ==================================================    ASSESSMENT/ PLAN:       ICD-10-CM ICD-9-CM    1. White matter lesion of central nervous system  G93.9 348.89 MRI [facility] SPINE W WO CONT      MRI [facility] SPINE W WO CONT      2.  Chronic migraine without aura without status migrainosus, not intractable  G43.709 346.70 erenumab-aooe (Aimovig Autoinjector) 70 mg/mL injection      ubrogepant (Ubrelvy) 100 mg tablet      3. Abnormal MRI, cervical spine  R93.7 793.7 MRI CERV SPINE W WO CONT      4. Abnormal MRI, thoracic spine  R93.7 793.7 MRI [facility] SPINE W WO CONT      5. DDD (degenerative disc disease), lumbar  M51.36 722.52       6. Chronic bilateral low back pain, unspecified whether sciatica present  M54.50 724.2     G89.29 338.29       7. POTS (postural orthostatic tachycardia syndrome)  G90. A 427.89          Normal, non-focal neurologic exam  Multiple neurologic complaints/ symptoms  One left cortical lesion in the inferior left temporal occipital region that has been stable for several years (4 by Radiologist count)    Unclear whether pt has any spinal cord lesions or lesions of the vertebrae  Previous neurologists last note didn't mention any spinal cord lesions, or suggestion of Multiple Sclerosis    Will check MRI C-spine and T-spine +/- contrast.  Asked pt to obtain prior MRI C-spine, T-spine images on CD and supply to [address] [place] [address] Radiology Dept so they can compare new scans to old ones. Also asked her to get copy of last Brain MRI on CD to keep on hand so it can be compared to any future Brain MRIs. For chronic migraine, restart Aimovig 70 mg SQ injector once monthly as migraine preventive. For acute migraine treatment, gave samples (#2 tablets) of Ubrelvy for pt try as she reports significant drowsiness from the Nurtec tablet    Follow up in 2 months to go over MRI results and reassess headaches      I spent 64 minutes on today's encounter      An electronic signature was used to authenticate this note.   -- Partha Bermudez MD

## 2023-03-22 NOTE — PROGRESS NOTES
Chief Complaint   Patient presents with    New Patient    Migraine     Patient presents today for long history of migraines, referred by her PCP, Dr Tarik Rawls.       Visit Vitals  /70   Pulse 64   Temp 97.7 °F (36.5 °C)   Resp 14   Ht 5' 2\" (1.575 m)   Wt 148 lb (67.1 kg)   SpO2 98%   BMI 27.07 kg/m²

## 2023-04-03 ENCOUNTER — PATIENT MESSAGE (OUTPATIENT)
Dept: NEUROLOGY | Age: 38
End: 2023-04-03

## 2023-04-05 ENCOUNTER — HOSPITAL ENCOUNTER (OUTPATIENT)
Dept: MRI IMAGING | Age: 38
End: 2023-04-05
Attending: PSYCHIATRY & NEUROLOGY
Payer: OTHER GOVERNMENT

## 2023-04-05 PROCEDURE — 74011250636 HC RX REV CODE- 250/636: Performed by: PSYCHIATRY & NEUROLOGY

## 2023-04-05 PROCEDURE — A9576 INJ PROHANCE MULTIPACK: HCPCS | Performed by: PSYCHIATRY & NEUROLOGY

## 2023-04-05 PROCEDURE — 72157 MRI CHEST SPINE W/O & W/DYE: CPT

## 2023-04-05 PROCEDURE — 72156 MRI NECK SPINE W/O & W/DYE: CPT

## 2023-04-05 RX ADMIN — GADOTERIDOL 12 ML: 279.3 INJECTION, SOLUTION INTRAVENOUS at 11:52

## 2023-04-27 DIAGNOSIS — F32.A DEPRESSION, UNSPECIFIED DEPRESSION TYPE: ICD-10-CM

## 2023-04-27 DIAGNOSIS — G90.A POTS (POSTURAL ORTHOSTATIC TACHYCARDIA SYNDROME): ICD-10-CM

## 2023-04-27 RX ORDER — TOPIRAMATE 50 MG/1
50 TABLET, FILM COATED ORAL DAILY
Qty: 90 TABLET | Refills: 1 | Status: SHIPPED | OUTPATIENT
Start: 2023-04-27

## 2023-04-27 RX ORDER — METOPROLOL SUCCINATE 25 MG/1
25 TABLET, EXTENDED RELEASE ORAL DAILY
Qty: 90 TABLET | Refills: 1 | Status: CANCELLED | OUTPATIENT
Start: 2023-04-27

## 2023-04-27 RX ORDER — BUPROPION HYDROCHLORIDE 300 MG/1
300 TABLET ORAL DAILY
Qty: 90 TABLET | Refills: 1 | Status: CANCELLED | OUTPATIENT
Start: 2023-04-27

## 2023-04-27 RX ORDER — TOPIRAMATE 100 MG/1
100 TABLET, FILM COATED ORAL DAILY
Qty: 90 TABLET | Refills: 1 | Status: SHIPPED | OUTPATIENT
Start: 2023-04-27

## 2023-05-31 ENCOUNTER — TELEPHONE (OUTPATIENT)
Age: 38
End: 2023-05-31

## 2023-05-31 ENCOUNTER — OFFICE VISIT (OUTPATIENT)
Age: 38
End: 2023-05-31
Payer: OTHER GOVERNMENT

## 2023-05-31 VITALS
RESPIRATION RATE: 14 BRPM | DIASTOLIC BLOOD PRESSURE: 76 MMHG | HEART RATE: 74 BPM | TEMPERATURE: 97.9 F | OXYGEN SATURATION: 99 % | SYSTOLIC BLOOD PRESSURE: 126 MMHG

## 2023-05-31 DIAGNOSIS — G89.29 CHRONIC BILATERAL LOW BACK PAIN WITHOUT SCIATICA: ICD-10-CM

## 2023-05-31 DIAGNOSIS — D18.09 VERTEBRAL BODY HEMANGIOMA: ICD-10-CM

## 2023-05-31 DIAGNOSIS — M47.814 THORACIC SPONDYLOSIS: ICD-10-CM

## 2023-05-31 DIAGNOSIS — G43.709 CHRONIC MIGRAINE WITHOUT AURA WITHOUT STATUS MIGRAINOSUS, NOT INTRACTABLE: Primary | ICD-10-CM

## 2023-05-31 DIAGNOSIS — M47.816 LUMBAR SPONDYLOSIS: ICD-10-CM

## 2023-05-31 DIAGNOSIS — M47.812 CERVICAL SPONDYLOSIS: ICD-10-CM

## 2023-05-31 DIAGNOSIS — R90.89 ABNORMAL FINDING ON MRI OF BRAIN: Chronic | ICD-10-CM

## 2023-05-31 DIAGNOSIS — M51.36 LUMBAR DEGENERATIVE DISC DISEASE: ICD-10-CM

## 2023-05-31 DIAGNOSIS — M54.50 CHRONIC BILATERAL LOW BACK PAIN WITHOUT SCIATICA: ICD-10-CM

## 2023-05-31 PROCEDURE — 99215 OFFICE O/P EST HI 40 MIN: CPT | Performed by: PSYCHIATRY & NEUROLOGY

## 2023-05-31 PROCEDURE — 99417 PROLNG OP E/M EACH 15 MIN: CPT | Performed by: PSYCHIATRY & NEUROLOGY

## 2023-05-31 RX ORDER — ERENUMAB-AOOE 140 MG/ML
140 INJECTION, SOLUTION SUBCUTANEOUS
COMMUNITY
Start: 2022-04-11 | End: 2023-05-31

## 2023-05-31 RX ORDER — ONDANSETRON 4 MG/1
TABLET, FILM COATED ORAL
Qty: 16 TABLET | Refills: 2 | Status: SHIPPED | OUTPATIENT
Start: 2023-05-31

## 2023-05-31 ASSESSMENT — PATIENT HEALTH QUESTIONNAIRE - PHQ9
SUM OF ALL RESPONSES TO PHQ QUESTIONS 1-9: 0
SUM OF ALL RESPONSES TO PHQ QUESTIONS 1-9: 0
2. FEELING DOWN, DEPRESSED OR HOPELESS: 0
SUM OF ALL RESPONSES TO PHQ QUESTIONS 1-9: 0
SUM OF ALL RESPONSES TO PHQ9 QUESTIONS 1 & 2: 0
1. LITTLE INTEREST OR PLEASURE IN DOING THINGS: 0
SUM OF ALL RESPONSES TO PHQ QUESTIONS 1-9: 0

## 2023-05-31 NOTE — PROGRESS NOTES
34 y.o. female, ESTABLISHED PATIENT. Migraine (Patient states amovig worked well the first month and is due again, will send for PA. Patient states she has had one really bad migraine, with nausea, refill zofran 4 mg, and she needs aimovig sample.)    See Initial Visit 3- for very detailed hx    PMHx relevant for: Anemia, Autoimmune disease ([address] Chronic pain, Depression, Headache, and POTS (postural orthostatic tachycardia syndrome). 05/31/23  HX:     1) Restarted Aimovig 140 mg SQ monthly at last/ initial visit. Says has only had 2 migraine days since last visit. Waiting on Prior Authorization from [employer]. For acute migraine relief, using Leartis Fulling but says that it makes her feel nauseous so she avoids taking it. Uses her Nausea medication (zofran) to treat her migraines (with nausea). 2) White Matter lesion (single): Reviewed all the following done by pt's prior Neurologist (Dr Austin Donaldson, [place] [address] [facility], [place]. All of these were scanned into the media section of [facility]: 2 clinic notes, MRI Lumbar Spine report (9-9-21), Brain MRI (+/- contrast) report (9-9-21), EMG report bilateral upper extremities done on 6- by prior Neurologist. Reviewed patient's most recent Brain MRI images from (10-10-22) with her today. Reviewed MRI C-spine and T-spine reports from 4-5-23 and images with patient today. Discussed the following:   MRI Brain from Oct 2022 shows a small focal area of white matter hyperintensity in the left posterior temporal-parietal lobe. Radiologist has noted that this findings has been stable for 4 years. Recommended follow up imaging in 1-2 years for surveillance. MRI Lumbar spine showed ddd/ facet arthropathy from L3-4 to L5-S1 with additional findings of osseus lesions in L2 and L3 vertebrae suggestive of hemangioma and similar foci in sacrum bilaterally (incompletely characterized).  Radiologist recommended Xray of these regions to evaluate

## 2023-05-31 NOTE — TELEPHONE ENCOUNTER
----- Message from Arnol Stevenson MD sent at 5/31/2023  4:41 PM EDT -----  Regarding: Ordered X-rays after pt left  Let pt know that the Radiologist who made the report of her MRI Lumbar Spine from Sept 2021 recommended that she have x-ray of her lumbar spine and sacrum to confirm that the bony abnormalities remained stable. I entered those orders after she left. Please let her know about them.

## 2023-07-18 ENCOUNTER — TELEPHONE (OUTPATIENT)
Age: 38
End: 2023-07-18

## 2023-07-19 NOTE — TELEPHONE ENCOUNTER
RE:Aimovig  Key: [id number]     Rcvd notification via CMM that medication has been approved. Approvedon July 18  CaseId:[id number];Status:Approved; Review Type:Prior Auth; Coverage Start Date:06/18/2023; Coverage End Date:01/14/2024;      Nurse notified

## 2023-07-24 RX ORDER — BUPROPION HYDROCHLORIDE 300 MG/1
TABLET ORAL
Qty: 90 TABLET | Refills: 3 | Status: SHIPPED | OUTPATIENT
Start: 2023-07-24

## 2023-08-01 ENCOUNTER — OFFICE VISIT (OUTPATIENT)
Facility: CLINIC | Age: 38
End: 2023-08-01
Payer: OTHER GOVERNMENT

## 2023-08-01 ENCOUNTER — HOSPITAL ENCOUNTER (OUTPATIENT)
Facility: HOSPITAL | Age: 38
Setting detail: SPECIMEN
Discharge: HOME | End: 2023-08-04
Payer: OTHER GOVERNMENT

## 2023-08-01 VITALS
BODY MASS INDEX: 28.16 KG/M2 | WEIGHT: 153 LBS | HEART RATE: 81 BPM | TEMPERATURE: 98.3 F | SYSTOLIC BLOOD PRESSURE: 90 MMHG | OXYGEN SATURATION: 99 % | HEIGHT: 62 IN | RESPIRATION RATE: 18 BRPM | DIASTOLIC BLOOD PRESSURE: 61 MMHG

## 2023-08-01 DIAGNOSIS — F41.9 ANXIETY: ICD-10-CM

## 2023-08-01 DIAGNOSIS — Z01.419 WELL WOMAN EXAM WITH ROUTINE GYNECOLOGICAL EXAM: Primary | ICD-10-CM

## 2023-08-01 DIAGNOSIS — N93.9 ABNORMAL UTERINE BLEEDING: ICD-10-CM

## 2023-08-01 DIAGNOSIS — R32 INCONTINENCE OF URINE IN FEMALE: ICD-10-CM

## 2023-08-01 DIAGNOSIS — N89.8 VAGINAL DISCHARGE: ICD-10-CM

## 2023-08-01 DIAGNOSIS — G90.A POTS (POSTURAL ORTHOSTATIC TACHYCARDIA SYNDROME): ICD-10-CM

## 2023-08-01 LAB
HCG, PREGNANCY, URINE, POC: NEGATIVE
VALID INTERNAL CONTROL, POC: YES

## 2023-08-01 PROCEDURE — 81025 URINE PREGNANCY TEST: CPT

## 2023-08-01 PROCEDURE — 87661 TRICHOMONAS VAGINALIS AMPLIF: CPT

## 2023-08-01 PROCEDURE — 87591 N.GONORRHOEAE DNA AMP PROB: CPT

## 2023-08-01 PROCEDURE — 88175 CYTOPATH C/V AUTO FLUID REDO: CPT

## 2023-08-01 PROCEDURE — 99214 OFFICE O/P EST MOD 30 MIN: CPT

## 2023-08-01 PROCEDURE — 87624 HPV HI-RISK TYP POOLED RSLT: CPT

## 2023-08-01 PROCEDURE — 87491 CHLMYD TRACH DNA AMP PROBE: CPT

## 2023-08-01 RX ORDER — BUPROPION HYDROCHLORIDE 150 MG/1
150 TABLET ORAL EVERY MORNING
Qty: 30 TABLET | Refills: 3 | Status: SHIPPED | OUTPATIENT
Start: 2023-08-01

## 2023-08-01 RX ORDER — FLUCONAZOLE 150 MG/1
150 TABLET ORAL ONCE
Qty: 1 TABLET | Refills: 0 | Status: SHIPPED | OUTPATIENT
Start: 2023-08-01 | End: 2023-08-01

## 2023-08-01 SDOH — ECONOMIC STABILITY: INCOME INSECURITY: HOW HARD IS IT FOR YOU TO PAY FOR THE VERY BASICS LIKE FOOD, HOUSING, MEDICAL CARE, AND HEATING?: NOT HARD AT ALL

## 2023-08-01 SDOH — ECONOMIC STABILITY: HOUSING INSECURITY
IN THE LAST 12 MONTHS, WAS THERE A TIME WHEN YOU DID NOT HAVE A STEADY PLACE TO SLEEP OR SLEPT IN A SHELTER (INCLUDING NOW)?: NO

## 2023-08-01 SDOH — ECONOMIC STABILITY: FOOD INSECURITY: WITHIN THE PAST 12 MONTHS, YOU WORRIED THAT YOUR FOOD WOULD RUN OUT BEFORE YOU GOT MONEY TO BUY MORE.: NEVER TRUE

## 2023-08-01 SDOH — ECONOMIC STABILITY: FOOD INSECURITY: WITHIN THE PAST 12 MONTHS, THE FOOD YOU BOUGHT JUST DIDN'T LAST AND YOU DIDN'T HAVE MONEY TO GET MORE.: NEVER TRUE

## 2023-08-01 NOTE — PROGRESS NOTES
I saw and evaluated the patient, performing the key elements of the service. I discussed the findings, assessment and plan with the resident and agree with the resident's findings and plan as documented in the resident's note. Abnormal uterine bleeding - hx ablation 2016. Bleeding resumed 3 months ago, lasts a few days at a time, comes monthly without spotting in between. Agree with work up for ovulatory AUB.

## 2023-08-01 NOTE — PROGRESS NOTES
Chief Complaint   Patient presents with    Gynecologic Exam     1. \"Have you been to the ER, urgent care clinic since your last visit? Hospitalized since your last visit? \" [facility]- [facility]. 2. \"Have you seen or consulted any other health care providers outside of the [address] [place] [address] since your last visit? \" No     3. For patients aged 43-73: Has the patient had a colonoscopy / FIT/ Cologuard? N/A      If the patient is female:    4. For patients aged 43-66: Has the patient had a mammogram within the past 2 years? Yes      5. For patients aged 21-65: Has the patient had a pap smear? Scheduled today.     Health Maintenance Due   Topic Date Due    COVID-19 Vaccine (1) Never done    Varicella vaccine (1 of 2 - 2-dose childhood series) Never done    HIV screen  Never done    Hepatitis C screen  Never done    DTaP/Tdap/Td vaccine (1 - Tdap) Never done    Diabetes screen  Never done    Lipids  Never done    Flu vaccine (1) Never done

## 2023-08-01 NOTE — PROGRESS NOTES
[facility]  [facility], [address] [place] [address],  [address]  Tel: [phone]  Fax: [phone]    Subjective:   Samm Johnson is an 34 y.o. female with PMH anxiety, depression, POTs, Migraine headaches who presents for complete physical exam and pap smear. Chief Complaint   Patient presents with    Gynecologic Exam     Today patient is overall feeling well. States she would like a pap smear today. States she has noticed new onset of monthly periods since may. Previously she did not have a period s/p uterine artery ablation \"many\" years ago. She states her new monthly periods come in regular monthly intervals and last for a few days. Associated cramping. Mild bleeding. Denies any clots or pain. Denies any previous diagnoses of fibroids, adenomyosis. Will request that her GYN notes get sent to our office. Shares she has also noticed new hot flashes, increased anxiety, and weight gain. Is unsure if the abnormal bleeding could be related. She shares her mother went through menopause in her early 45s. Is interested in Edvivo. Patient has also noticed increased \"bread\"-like vaginal order lately. She shares she has a history of yeast infections. Per patient, odor is more noticeable during sexual intercourse with . She has noticed increased white / watery discharge as well. Denies any vaginal pain or tenderness. Denies any urinary symptoms. She does not believe she can be pregnant as she is s/p tubal ligation \"10+ years ago\". Due to recent increase in anxiety, patient would like to explore other options. She has tried celexa and lexapro in the past and had stopped due to side effects. She is currently on Wellbutrin 300 mg daily. States she noticed her anxiety levels had increased during the same time as her hot flashes and weightgain. States her depression is not an issue.  Mainly feels like she is on an anxiety \"rollar

## 2023-08-02 DIAGNOSIS — G43.709 CHRONIC MIGRAINE WITHOUT AURA WITHOUT STATUS MIGRAINOSUS, NOT INTRACTABLE: Primary | ICD-10-CM

## 2023-08-02 LAB
ERYTHROCYTE [DISTWIDTH] IN BLOOD BY AUTOMATED COUNT: 11.9 % (ref 11.5–14.5)
FSH SERPL-ACNC: 2.2 MIU/ML
HCT VFR BLD AUTO: 44.9 % (ref 35–47)
HGB BLD-MCNC: 14.4 G/DL (ref 11.5–16)
MCH RBC QN AUTO: 30.6 PG (ref 26–34)
MCHC RBC AUTO-ENTMCNC: 32.1 G/DL (ref 30–36.5)
MCV RBC AUTO: 95.3 FL (ref 80–99)
NRBC # BLD: 0 K/UL (ref 0–0.01)
NRBC BLD-RTO: 0 PER 100 WBC
PLATELET # BLD AUTO: 216 K/UL (ref 150–400)
PMV BLD AUTO: 10.6 FL (ref 8.9–12.9)
RBC # BLD AUTO: 4.71 M/UL (ref 3.8–5.2)
TSH SERPL DL<=0.05 MIU/L-ACNC: 1.91 UIU/ML (ref 0.36–3.74)
WBC # BLD AUTO: 7.2 K/UL (ref 3.6–11)

## 2023-08-04 ENCOUNTER — HOSPITAL ENCOUNTER (OUTPATIENT)
Facility: HOSPITAL | Age: 38
Discharge: HOME | End: 2023-08-04
Payer: OTHER GOVERNMENT

## 2023-08-04 ENCOUNTER — TELEPHONE (OUTPATIENT)
Facility: CLINIC | Age: 38
End: 2023-08-04

## 2023-08-04 DIAGNOSIS — N93.9 ABNORMAL UTERINE BLEEDING: ICD-10-CM

## 2023-08-04 DIAGNOSIS — Z13.6 SCREENING FOR HEART DISEASE: ICD-10-CM

## 2023-08-04 DIAGNOSIS — G43.709 CHRONIC MIGRAINE WITHOUT AURA WITHOUT STATUS MIGRAINOSUS, NOT INTRACTABLE: Primary | ICD-10-CM

## 2023-08-04 PROCEDURE — 76830 TRANSVAGINAL US NON-OB: CPT

## 2023-08-04 PROCEDURE — 76856 US EXAM PELVIC COMPLETE: CPT

## 2023-08-04 NOTE — RESULT ENCOUNTER NOTE
Reviewed. TSH 1.91 wnl. [address] wnl, 2.2. CBC wnl, POC urine preg test wnl. Patient made aware.

## 2023-08-07 LAB
C TRACH RRNA SPEC QL NAA+PROBE: NEGATIVE
N GONORRHOEA RRNA SPEC QL NAA+PROBE: NEGATIVE
SPECIMEN SOURCE: NORMAL
T VAGINALIS RRNA SPEC QL NAA+PROBE: NEGATIVE

## 2023-08-07 NOTE — RESULT ENCOUNTER NOTE
Pelvic & Transvaginal US Reviewed. 1.8 x 1.5 cm right ovarian hemorrhagic cyst. Normal uterus and endometrium. Patient aware.

## 2023-08-10 NOTE — RESULT ENCOUNTER NOTE
Reviewed results of PAP smear from 8/22/2017, which indicated NILM, HPV negative. Patient had PAP smear on 8/1/2023 (performed by Dr. Anna Shaffer).

## 2023-09-18 ENCOUNTER — OFFICE VISIT (OUTPATIENT)
Facility: CLINIC | Age: 38
End: 2023-09-18

## 2023-09-18 VITALS
DIASTOLIC BLOOD PRESSURE: 62 MMHG | SYSTOLIC BLOOD PRESSURE: 98 MMHG | WEIGHT: 155 LBS | OXYGEN SATURATION: 99 % | HEART RATE: 92 BPM | RESPIRATION RATE: 14 BRPM | TEMPERATURE: 97.4 F | HEIGHT: 62 IN | BODY MASS INDEX: 28.52 KG/M2

## 2023-09-18 DIAGNOSIS — R87.610 ATYPICAL SQUAMOUS CELLS OF UNDETERMINED SIGNIFICANCE ON CYTOLOGIC SMEAR OF CERVIX (ASC-US): Primary | ICD-10-CM

## 2023-09-18 NOTE — PROGRESS NOTES
1. \"Have you been to the ER, urgent care clinic since your last visit? Hospitalized since your last visit? \" NO    2. \"Have you seen or consulted any other health care providers outside of the [address] [place] [address] since your last visit? \" no    3. For patients aged 43-73: Has the patient had a colonoscopy / FIT/ Cologuard? N/A      If the patient is female:    4. For patients aged 43-66: Has the patient had a mammogram within the past 2 years? YES      5. For patients aged 21-65: Has the patient had a pap smear?  YES    Health Maintenance Due   Topic Date Due    Hepatitis B vaccine (1 of 3 - 3-dose series) Never done    COVID-19 Vaccine (1) Never done    Varicella vaccine (1 of 2 - 2-dose childhood series) Never done    HIV screen  Never done    Hepatitis C screen  Never done    DTaP/Tdap/Td vaccine (1 - Tdap) Never done    Diabetes screen  Never done    Lipids  Never done    Flu vaccine (1) Never done
Patient needs colpo--referred to residents. No charge for today's visit.
Resource Strain (CARDIA)     Difficulty of Paying Living Expenses: Not hard at all   Food Insecurity: No Food Insecurity (8/1/2023)    Hunger Vital Sign     Worried About Running Out of Food in the Last Year: Never true     Ran Out of Food in the Last Year: Never true   Transportation Needs: Unknown (8/1/2023)    PRAPARE - Transportation     Lack of Transportation (Non-Medical): No   Physical Activity: Sufficiently Active (1/21/2023)    Exercise Vital Sign     Days of Exercise per Week: 7 days     Minutes of Exercise per Session: 30 min   Intimate Partner Violence: Not At Risk (1/21/2023)    Humiliation, Afraid, Rape, and Kick questionnaire     Fear of Current or Ex-Partner: No     Emotionally Abused: No     Physically Abused: No     Sexually Abused: No   Housing Stability: Unknown (8/1/2023)    Housing Stability Vital Sign     Unstable Housing in the Last Year: No        Review of Systems   Review of Systems      Objective: There were no vitals taken for this visit.    Physical Exam   PHYSICAL EXAM:  Gen: Pt sitting in chair, in NAD  Head: Normocephalic, atraumatic  Eyes: Sclera anicteric, EOM grossly intact,  Ears: TM's pearly with good light reflex b/l  Throat: MMM, normal lips, tongue, teeth and gums  Neck: Supple, no LAD, no thyromegaly or carotid bruits  CVS: Normal S1, S2, no m/r/g  Resp: CTAB, no wheezes or rales  Abd: Soft, non-tender, non-distended, +BS  Extrem: Atraumatic, no cyanosis or edema  Pulses: 2+   Skin: Warm, dry  Neuro: Alert, oriented, appropriate    Pertinent Labs/Studies:  No data recorded       2/28/2023     9:00 AM   KAIDEN-7 SCREENING   Feeling nervous, anxious, or on edge Several days   Not being able to stop or control worrying Several days   Worrying too much about different things Several days   Trouble relaxing Several days   Being so restless that it is hard to sit still Several days   Becoming easily annoyed or irritable Several days   Feeling afraid as if something awful might

## 2023-10-13 ENCOUNTER — HOSPITAL ENCOUNTER (OUTPATIENT)
Facility: HOSPITAL | Age: 38
Setting detail: SPECIMEN
Discharge: HOME | End: 2023-10-16

## 2023-10-13 ENCOUNTER — PROCEDURE VISIT (OUTPATIENT)
Facility: CLINIC | Age: 38
End: 2023-10-13
Payer: OTHER GOVERNMENT

## 2023-10-13 VITALS
HEART RATE: 81 BPM | SYSTOLIC BLOOD PRESSURE: 96 MMHG | RESPIRATION RATE: 18 BRPM | TEMPERATURE: 98.4 F | BODY MASS INDEX: 28.71 KG/M2 | HEIGHT: 62 IN | DIASTOLIC BLOOD PRESSURE: 65 MMHG | WEIGHT: 156 LBS | OXYGEN SATURATION: 99 %

## 2023-10-13 DIAGNOSIS — R87.810 ASCUS WITH POSITIVE HIGH RISK HPV CERVICAL: Primary | ICD-10-CM

## 2023-10-13 DIAGNOSIS — R87.610 ASCUS WITH POSITIVE HIGH RISK HPV CERVICAL: Primary | ICD-10-CM

## 2023-10-13 PROCEDURE — 99213 OFFICE O/P EST LOW 20 MIN: CPT | Performed by: STUDENT IN AN ORGANIZED HEALTH CARE EDUCATION/TRAINING PROGRAM

## 2023-10-13 PROCEDURE — 57454 BX/CURETT OF CERVIX W/SCOPE: CPT | Performed by: STUDENT IN AN ORGANIZED HEALTH CARE EDUCATION/TRAINING PROGRAM

## 2023-10-13 NOTE — PROGRESS NOTES
Colposcopy Procedure Note    Jennifer Evans is a 34 y.o. female who is here for colposcopy. Dysplasia Hx:     Most recent pap smear on 08/01/2023 showed ASCUS, +HPV. Prior abnormal paps/colpo results:   08/01/2023: ATYPICAL SQUAMOUS CELLS OF UNDETERMINED SIGNIFICANCE. Positive for high risk HPV  09/22/2017: NEGATIVE FOR INTRAEPITHELIAL LESION AND MALIGNANCY. Negative for high-risk HPV      COLPOSCOPY PROCEDURE PROGRESS NOTE    Chart reviewed for the following:   Lucius ROBLES DO, have reviewed the History, Physical and updated the Allergic reactions for [address] performed immediately prior to start of procedure:   Lucius ROBLES DO, have performed the following reviews on Jennifer Evans prior to the start of the procedure:            * Patient was identified by name and date of birth   * Agreement on procedure being performed was verified  * Risks and Benefits explained to the patient  * Procedure site verified and marked as necessary  * Patient was positioned for comfort  * Consent was signed and verified     Time: 9:20 AM    Date of procedure: 10/13/2023    Procedure performed by:  Lucius Krishnan DO, Magda Marinelli DO    Provider assisted by: Navneet Bonilla LPN     How tolerated by patient: well    Comments: no complications       Procedure Details   The risks and benefits of the procedure and written informed consent obtained. Speculum placed in vagina and excellent visualization of cervix achieved, cervix swabbed with acetic acid solution and viewed through colposcope. Findings:  Cervix: endocervical speculum placed, cervix swabbed with acetic acid swab cervix, acetowhite lesion(s) noted at 9 o'clock; swabbed with Lugol's solution, cervical biopsies taken at 9 o'clock, endocervical curettage performed, specimen(s) labelled and sent to pathology, and hemostasis achieved with Monsel's solution.     Vaginal inspection: full vaginal colpo not performed but edges surrounding

## 2023-11-15 ENCOUNTER — OFFICE VISIT (OUTPATIENT)
Age: 38
End: 2023-11-15
Payer: OTHER GOVERNMENT

## 2023-11-15 VITALS
DIASTOLIC BLOOD PRESSURE: 72 MMHG | OXYGEN SATURATION: 99 % | RESPIRATION RATE: 16 BRPM | SYSTOLIC BLOOD PRESSURE: 124 MMHG | HEART RATE: 88 BPM

## 2023-11-15 DIAGNOSIS — G43.709 CHRONIC MIGRAINE WITHOUT AURA WITHOUT STATUS MIGRAINOSUS, NOT INTRACTABLE: Primary | ICD-10-CM

## 2023-11-15 DIAGNOSIS — M54.50 CHRONIC BILATERAL LOW BACK PAIN, UNSPECIFIED WHETHER SCIATICA PRESENT: ICD-10-CM

## 2023-11-15 DIAGNOSIS — G89.29 CHRONIC BILATERAL LOW BACK PAIN, UNSPECIFIED WHETHER SCIATICA PRESENT: ICD-10-CM

## 2023-11-15 DIAGNOSIS — R90.89 ABNORMAL FINDING ON MRI OF BRAIN: ICD-10-CM

## 2023-11-15 PROCEDURE — 99214 OFFICE O/P EST MOD 30 MIN: CPT | Performed by: PSYCHIATRY & NEUROLOGY

## 2023-11-15 RX ORDER — ERENUMAB-AOOE 140 MG/ML
140 INJECTION, SOLUTION SUBCUTANEOUS
Qty: 3 ADJUSTABLE DOSE PRE-FILLED PEN SYRINGE | Refills: 1 | Status: SHIPPED | OUTPATIENT
Start: 2023-11-15 | End: 2024-05-13

## 2023-11-15 RX ORDER — ERENUMAB-AOOE 140 MG/ML
140 INJECTION, SOLUTION SUBCUTANEOUS
COMMUNITY
End: 2023-11-15 | Stop reason: SDUPTHER

## 2023-11-15 NOTE — PROGRESS NOTES
ligation. FHx: family history includes Anemia in her maternal grandmother; Cancer in her maternal grandfather, maternal grandmother, and paternal grandmother; Hypertension in her father and mother; Migraines in her mother. SocHx:  reports that she quit smoking about 11 years ago. Her smoking use included cigarettes. She has a 5.00 pack-year smoking history. She has been exposed to tobacco smoke. She has never used smokeless tobacco. She reports current alcohol use. She reports that she does not use drugs.

## 2023-12-11 ENCOUNTER — OFFICE VISIT (OUTPATIENT)
Facility: CLINIC | Age: 38
End: 2023-12-11
Payer: OTHER GOVERNMENT

## 2023-12-11 VITALS
SYSTOLIC BLOOD PRESSURE: 107 MMHG | HEART RATE: 73 BPM | OXYGEN SATURATION: 100 % | TEMPERATURE: 97.7 F | DIASTOLIC BLOOD PRESSURE: 74 MMHG | WEIGHT: 161.6 LBS | BODY MASS INDEX: 29.56 KG/M2 | RESPIRATION RATE: 16 BRPM

## 2023-12-11 DIAGNOSIS — F32.A DEPRESSION, UNSPECIFIED DEPRESSION TYPE: ICD-10-CM

## 2023-12-11 DIAGNOSIS — M51.36 LUMBAR DEGENERATIVE DISC DISEASE: ICD-10-CM

## 2023-12-11 DIAGNOSIS — M54.50 LUMBAR PAIN: Primary | ICD-10-CM

## 2023-12-11 DIAGNOSIS — G43.809 OTHER MIGRAINE, NOT INTRACTABLE, WITHOUT STATUS MIGRAINOSUS: ICD-10-CM

## 2023-12-11 PROCEDURE — 99214 OFFICE O/P EST MOD 30 MIN: CPT | Performed by: FAMILY MEDICINE

## 2023-12-11 RX ORDER — METHOCARBAMOL 750 MG/1
750 TABLET, FILM COATED ORAL 3 TIMES DAILY
Qty: 30 TABLET | Refills: 0 | Status: SHIPPED | OUTPATIENT
Start: 2023-12-11 | End: 2023-12-21

## 2023-12-11 RX ORDER — PREDNISONE 20 MG/1
TABLET ORAL
Qty: 10 TABLET | Refills: 0 | Status: SHIPPED | OUTPATIENT
Start: 2023-12-11

## 2023-12-11 RX ORDER — DICLOFENAC SODIUM 75 MG/1
75 TABLET, DELAYED RELEASE ORAL 2 TIMES DAILY
Qty: 60 TABLET | Refills: 1 | Status: SHIPPED | OUTPATIENT
Start: 2023-12-11

## 2024-02-15 ENCOUNTER — OFFICE VISIT (OUTPATIENT)
Facility: CLINIC | Age: 39
End: 2024-02-15
Payer: OTHER GOVERNMENT

## 2024-02-15 VITALS
HEART RATE: 79 BPM | WEIGHT: 158 LBS | OXYGEN SATURATION: 100 % | DIASTOLIC BLOOD PRESSURE: 77 MMHG | RESPIRATION RATE: 18 BRPM | SYSTOLIC BLOOD PRESSURE: 110 MMHG | BODY MASS INDEX: 29.08 KG/M2 | TEMPERATURE: 98.4 F | HEIGHT: 62 IN

## 2024-02-15 DIAGNOSIS — Z12.31 SCREENING MAMMOGRAM FOR BREAST CANCER: Primary | ICD-10-CM

## 2024-02-15 DIAGNOSIS — Z80.8 FAMILY HISTORY OF MELANOMA: ICD-10-CM

## 2024-02-15 DIAGNOSIS — D22.5: ICD-10-CM

## 2024-02-15 DIAGNOSIS — R92.30 DENSE BREAST TISSUE ON MAMMOGRAM, UNSPECIFIED TYPE: ICD-10-CM

## 2024-02-15 DIAGNOSIS — B30.9 VIRAL CONJUNCTIVITIS OF BOTH EYES: ICD-10-CM

## 2024-02-15 DIAGNOSIS — N63.25 BREAST LUMP ON LEFT SIDE AT 3 O'CLOCK POSITION: ICD-10-CM

## 2024-02-15 PROCEDURE — 99214 OFFICE O/P EST MOD 30 MIN: CPT

## 2024-02-15 NOTE — PROGRESS NOTES
\"Have you been to the ER, urgent care clinic since your last visit?  Hospitalized since your last visit?\"      no  “Have you seen or consulted any other health care providers outside of [facility] since your last visit?”         no        
has not tried allergy relief medications/antihistamines.  Would expect symptoms to go away within 2 to 6 weeks if viral etiology.   -Discussed benefit with trial of over-the-counter antihistamines  -Avoid contact lens use until symptoms resolve  -Avoid wearing make-up until symptoms resolve  -Instructed patient that if symptoms worsen or if signs of purulent drainage call the office for follow-up as she may be developing bacterial conjunctivitis     Follow up:   Follow up as needed    Pt was discussed with Dr Brett Dubon MD (attending physician).    I have reviewed pertinent labs results and other data. I have discussed the diagnosis with the patient and the intended plan as seen in the above orders. The patient has received an after-visit summary and questions were answered concerning future plans. I have discussed medication side effects and warnings with the patient as well.    Regla Padron, DO  Resident [facility]  02/15/24

## 2024-02-28 ENCOUNTER — PATIENT MESSAGE (OUTPATIENT)
Facility: CLINIC | Age: 39
End: 2024-02-28

## 2024-02-28 DIAGNOSIS — F41.9 ANXIETY: ICD-10-CM

## 2024-02-28 RX ORDER — BUPROPION HYDROCHLORIDE 300 MG/1
300 TABLET ORAL EVERY MORNING
Qty: 90 TABLET | Refills: 3 | Status: SHIPPED | OUTPATIENT
Start: 2024-02-28 | End: 2025-02-22

## 2024-03-04 ENCOUNTER — HOSPITAL ENCOUNTER (OUTPATIENT)
Facility: HOSPITAL | Age: 39
Discharge: HOME | End: 2024-03-07
Payer: OTHER GOVERNMENT

## 2024-03-04 DIAGNOSIS — Z12.31 SCREENING MAMMOGRAM FOR BREAST CANCER: ICD-10-CM

## 2024-03-04 DIAGNOSIS — N63.25 BREAST LUMP ON LEFT SIDE AT 3 O'CLOCK POSITION: ICD-10-CM

## 2024-03-04 PROCEDURE — 76642 ULTRASOUND BREAST LIMITED: CPT

## 2024-03-04 PROCEDURE — G0279 TOMOSYNTHESIS, MAMMO: HCPCS

## 2024-03-14 ENCOUNTER — HOSPITAL ENCOUNTER (OUTPATIENT)
Facility: HOSPITAL | Age: 39
End: 2024-03-14
Payer: OTHER GOVERNMENT

## 2024-03-14 VITALS — HEIGHT: 62 IN | BODY MASS INDEX: 29.08 KG/M2 | WEIGHT: 158 LBS

## 2024-03-14 DIAGNOSIS — N63.21 MASS OF UPPER OUTER QUADRANT OF LEFT BREAST: ICD-10-CM

## 2024-03-14 PROCEDURE — 77065 DX MAMMO INCL CAD UNI: CPT

## 2024-03-14 PROCEDURE — 88305 TISSUE EXAM BY PATHOLOGIST: CPT

## 2024-03-14 PROCEDURE — 19083 BX BREAST 1ST LESION US IMAG: CPT

## 2024-03-14 NOTE — PROGRESS NOTES
1:25 p.m.  Patient received for a left breast ultrasound-guided biopsy/breast clip.  Procedure explained to patient as well as risks associated with procedure.  Discharge instructions briefly discussed with patient.  Patient expects to be contacted by phone with pathology results.    2:00 p.m.  Time-out performed with all participants present.    2:40 p.m.  Patient tolerated procedure well with minimal bleeding.  Pressure held for 5 minutes; a dressing was placed on biopsy site (steri-strip, gauze, and tegaderm).  Patient was then sent for a post biopsy mammogram.  Discharge instructions were reviewed with patient and a copy given to patient.  Dressing was dry and intact on discharge; an ice pack was placed over dressing to site.  Patient expects to be contacted by phone with pathology results.   Referring Physician (Optional): Hugo ARMANDO

## 2024-03-18 NOTE — PROGRESS NOTES
Spoke with Dr. Sadiq Sheppard regarding patient's pathology results.  Dr. Sheppard stated that they were concordant; wants patient to schedule a 6 month left breast ultrasound.  I spoke with patient regarding her results and that Dr. Sheppard would like her to make an appointment in 6 months for a follow-up left breast ultrasound - patient is scheduled for 9/17/24 at 11:00 a.m..  Patient stated that her biopsy site was still a little sore, but otherwise okay.  A copy of the pathology results were fax'd to Dr. Regla Padron.

## 2024-05-21 ENCOUNTER — OFFICE VISIT (OUTPATIENT)
Age: 39
End: 2024-05-21
Payer: OTHER GOVERNMENT

## 2024-05-21 VITALS
OXYGEN SATURATION: 96 % | DIASTOLIC BLOOD PRESSURE: 67 MMHG | SYSTOLIC BLOOD PRESSURE: 108 MMHG | RESPIRATION RATE: 18 BRPM

## 2024-05-21 DIAGNOSIS — G43.709 CHRONIC MIGRAINE WITHOUT AURA WITHOUT STATUS MIGRAINOSUS, NOT INTRACTABLE: Primary | ICD-10-CM

## 2024-05-21 PROCEDURE — 99214 OFFICE O/P EST MOD 30 MIN: CPT | Performed by: NURSE PRACTITIONER

## 2024-05-21 RX ORDER — ERENUMAB-AOOE 140 MG/ML
1 INJECTION, SOLUTION SUBCUTANEOUS
COMMUNITY
End: 2024-05-21 | Stop reason: SDUPTHER

## 2024-05-21 RX ORDER — BUTALBITAL, ACETAMINOPHEN AND CAFFEINE 50; 325; 40 MG/1; MG/1; MG/1
TABLET ORAL
Qty: 60 TABLET | Refills: 4 | Status: SHIPPED | OUTPATIENT
Start: 2024-05-21

## 2024-05-21 RX ORDER — ERENUMAB-AOOE 140 MG/ML
1 INJECTION, SOLUTION SUBCUTANEOUS
Qty: 3 ML | Refills: 5 | Status: SHIPPED | OUTPATIENT
Start: 2024-05-21 | End: 2024-05-24 | Stop reason: SDUPTHER

## 2024-05-21 ASSESSMENT — PATIENT HEALTH QUESTIONNAIRE - PHQ9
SUM OF ALL RESPONSES TO PHQ QUESTIONS 1-9: 0
SUM OF ALL RESPONSES TO PHQ QUESTIONS 1-9: 0
2. FEELING DOWN, DEPRESSED OR HOPELESS: NOT AT ALL
SUM OF ALL RESPONSES TO PHQ9 QUESTIONS 1 & 2: 0
SUM OF ALL RESPONSES TO PHQ QUESTIONS 1-9: 0
1. LITTLE INTEREST OR PLEASURE IN DOING THINGS: NOT AT ALL
SUM OF ALL RESPONSES TO PHQ QUESTIONS 1-9: 0

## 2024-05-22 DIAGNOSIS — G89.29 CHRONIC BILATERAL LOW BACK PAIN WITHOUT SCIATICA: ICD-10-CM

## 2024-05-22 DIAGNOSIS — M54.50 CHRONIC BILATERAL LOW BACK PAIN WITHOUT SCIATICA: ICD-10-CM

## 2024-05-23 RX ORDER — ONDANSETRON 4 MG/1
TABLET, FILM COATED ORAL
Qty: 16 TABLET | Refills: 12 | Status: SHIPPED | OUTPATIENT
Start: 2024-05-23

## 2024-05-24 ENCOUNTER — TELEPHONE (OUTPATIENT)
Age: 39
End: 2024-05-24

## 2024-05-24 DIAGNOSIS — G43.709 CHRONIC MIGRAINE WITHOUT AURA WITHOUT STATUS MIGRAINOSUS, NOT INTRACTABLE: Primary | ICD-10-CM

## 2024-05-24 RX ORDER — ERENUMAB-AOOE 140 MG/ML
INJECTION, SOLUTION SUBCUTANEOUS
Qty: 3 ML | Refills: 3 | Status: SHIPPED | OUTPATIENT
Start: 2024-05-24

## 2024-05-24 NOTE — TELEPHONE ENCOUNTER
Pharmacy is calling to get clarification on medication Aimovig auto injector for patient.    Ref# [id number]

## 2024-05-24 NOTE — TELEPHONE ENCOUNTER
Looking at the RX- I see it erroneously states in the SIG to inject 1 MG instead of stating inject 1 ML per 30 days.    Corrected and resent to [facility]

## 2024-05-31 DIAGNOSIS — F90.0 ATTENTION DEFICIT HYPERACTIVITY DISORDER (ADHD), PREDOMINANTLY INATTENTIVE TYPE: Primary | ICD-10-CM

## 2024-05-31 DIAGNOSIS — F90.0 ATTENTION DEFICIT HYPERACTIVITY DISORDER (ADHD), PREDOMINANTLY INATTENTIVE TYPE: ICD-10-CM

## 2024-05-31 RX ORDER — LISDEXAMFETAMINE DIMESYLATE CAPSULES 10 MG/1
CAPSULE ORAL
Qty: 90 CAPSULE | Refills: 0 | Status: SHIPPED | OUTPATIENT
Start: 2024-05-31 | End: 2024-06-30

## 2024-05-31 RX ORDER — LISDEXAMFETAMINE DIMESYLATE CAPSULES 10 MG/1
CAPSULE ORAL
Qty: 30 CAPSULE | Refills: 0 | Status: SHIPPED | OUTPATIENT
Start: 2024-05-31 | End: 2024-05-31 | Stop reason: SDUPTHER

## 2024-06-03 ENCOUNTER — TELEPHONE (OUTPATIENT)
Age: 39
End: 2024-06-03

## 2024-06-03 NOTE — TELEPHONE ENCOUNTER
Thank you! I just got a call from [facility]. They are awaiting clarification before processing the Botox order. Can you check to see if there have been any messages from them?  I appreciate all of your assistance with this.

## 2024-09-04 SDOH — ECONOMIC STABILITY: INCOME INSECURITY: HOW HARD IS IT FOR YOU TO PAY FOR THE VERY BASICS LIKE FOOD, HOUSING, MEDICAL CARE, AND HEATING?: NOT HARD AT ALL

## 2024-09-04 SDOH — ECONOMIC STABILITY: FOOD INSECURITY: WITHIN THE PAST 12 MONTHS, THE FOOD YOU BOUGHT JUST DIDN'T LAST AND YOU DIDN'T HAVE MONEY TO GET MORE.: NEVER TRUE

## 2024-09-04 SDOH — ECONOMIC STABILITY: TRANSPORTATION INSECURITY
IN THE PAST 12 MONTHS, HAS LACK OF TRANSPORTATION KEPT YOU FROM MEETINGS, WORK, OR FROM GETTING THINGS NEEDED FOR DAILY LIVING?: NO

## 2024-09-04 SDOH — ECONOMIC STABILITY: FOOD INSECURITY: WITHIN THE PAST 12 MONTHS, YOU WORRIED THAT YOUR FOOD WOULD RUN OUT BEFORE YOU GOT MONEY TO BUY MORE.: NEVER TRUE

## 2024-09-05 ENCOUNTER — OFFICE VISIT (OUTPATIENT)
Facility: CLINIC | Age: 39
End: 2024-09-05
Payer: OTHER GOVERNMENT

## 2024-09-05 VITALS
HEART RATE: 94 BPM | HEIGHT: 62 IN | TEMPERATURE: 98.4 F | RESPIRATION RATE: 16 BRPM | BODY MASS INDEX: 30.73 KG/M2 | WEIGHT: 167 LBS | DIASTOLIC BLOOD PRESSURE: 72 MMHG | OXYGEN SATURATION: 99 % | SYSTOLIC BLOOD PRESSURE: 98 MMHG

## 2024-09-05 DIAGNOSIS — R68.2 DRY MOUTH: ICD-10-CM

## 2024-09-05 DIAGNOSIS — I89.0 LYMPHEDEMA: Primary | ICD-10-CM

## 2024-09-05 DIAGNOSIS — R23.2 HOT FLASHES: ICD-10-CM

## 2024-09-05 DIAGNOSIS — M79.89 RIGHT LEG SWELLING: ICD-10-CM

## 2024-09-05 DIAGNOSIS — Z13.1 DIABETES MELLITUS SCREENING: ICD-10-CM

## 2024-09-05 DIAGNOSIS — R68.89 COLD INTOLERANCE: ICD-10-CM

## 2024-09-05 DIAGNOSIS — R61 NIGHT SWEATS: ICD-10-CM

## 2024-09-05 DIAGNOSIS — Z13.220 LIPID SCREENING: ICD-10-CM

## 2024-09-05 PROCEDURE — 99214 OFFICE O/P EST MOD 30 MIN: CPT

## 2024-09-05 NOTE — PROGRESS NOTES
[address] [place], [place] [address]  Tel: [phone]     Chief Complaint   Patient presents with    Leg Pain     x 1 week    Hand Pain     Swelling x 1 week    Immunizations     Declines flu vaccine     History of Present Illness:  Linda Lainez is a 39 y.o. female with a PMHx of POTS, depression, migraine, and vasovagal episodes who presents to clinic for swelling.    Waking up swollen, cannot get her wedding band on.    Steadily gaining weight since 12/22, up to 167 today, has been steadily gaining weight.    Has occasional hot flashes, is sensitive to cold, has less energy. Has chronic migraines. Occasionally feels that her forearms tense up if she is working too much.    Past Medical History:   Diagnosis Date    Anemia     Anemic during all my pregnancies and then when I got sick and started passing out my white blood counts were high and i was sent to hematology to have my levels checked. I had to receive IV iron for months until my levels were normal again.    Anxiety     Autoimmune disease (HCC)     Chronic pain     Depression     More anxiety than depression?I do not like to be in crowds of people or like changes. I am bery particular about things and have to do things a certain way. But, I am also so tired all of the time.    Headache     POTS (postural orthostatic tachycardia syndrome)        Current Outpatient Medications   Medication Sig Dispense Refill    Erenumab-aooe (AIMOVIG) 140 MG/ML SOAJ Inject 1 ml into the skin every 30 days. 3 mL 3    ondansetron (ZOFRAN) 4 MG tablet TAKE 1 TABLET AT ONSET OF MIGRAINE - RELATED NAUSEA, MAY REPEAT 1 TABLET IN 6 HOURS IF NEEDED, LIMIT USE TO 2 DAYS A WEEK 16 tablet 12    butalbital-acetaminophen-caffeine (FIORICET, ESGIC) -40 MG per tablet 1-2 tablets by mouth every 8 hours PRN for headache 60 tablet 4    buPROPion (WELLBUTRIN XL) 300 MG extended release tablet Take 1 tablet by mouth every morning 90 tablet 3    amitriptyline (ELAVIL) 10

## 2024-09-05 NOTE — PROGRESS NOTES
Reviewed record in preparation for visit and have necessary documentation  Pt did not bring medication to office visit for review  opportunity was given for questions  \"Have you been to the ER, urgent care clinic since your last visit?  Hospitalized since your last visit?\"    NO    “Have you seen or consulted any other health care providers outside of [facility] since your last visit?”    NO      Click Here for Release of Records Request     Goals that were addressed and/or need to be completed during or after this appointment include   Health Maintenance Due   Topic Date Due    Varicella vaccine (1 of 2 - 13+ 2-dose series) Never done    HIV screen  Never done    Hepatitis C screen  Never done    Hepatitis B vaccine (1 of 3 - 19+ 3-dose series) Never done    DTaP/Tdap/Td vaccine (1 - Tdap) Never done    Diabetes screen  Never done    Lipids  Never done    COVID-19 Vaccine (1 - 2023-24 season) Never done

## 2025-02-17 DIAGNOSIS — F41.9 ANXIETY: ICD-10-CM

## 2025-02-17 RX ORDER — BUPROPION HYDROCHLORIDE 300 MG/1
300 TABLET ORAL EVERY MORNING
Qty: 90 TABLET | Refills: 3 | Status: SHIPPED | OUTPATIENT
Start: 2025-02-17

## 2025-06-11 DIAGNOSIS — M54.50 CHRONIC BILATERAL LOW BACK PAIN WITHOUT SCIATICA: ICD-10-CM

## 2025-06-11 DIAGNOSIS — G89.29 CHRONIC BILATERAL LOW BACK PAIN WITHOUT SCIATICA: ICD-10-CM

## 2025-06-12 RX ORDER — ONDANSETRON 4 MG/1
TABLET, FILM COATED ORAL
Qty: 16 TABLET | Refills: 1 | Status: SHIPPED | OUTPATIENT
Start: 2025-06-12

## 2025-07-27 ENCOUNTER — PATIENT MESSAGE (OUTPATIENT)
Age: 40
End: 2025-07-27

## 2025-07-27 DIAGNOSIS — M79.602 LEFT ARM PAIN: ICD-10-CM

## 2025-07-27 DIAGNOSIS — M48.02 CERVICAL STENOSIS OF SPINAL CANAL: ICD-10-CM

## 2025-07-27 DIAGNOSIS — R20.0 LEFT ARM NUMBNESS: ICD-10-CM

## 2025-07-27 DIAGNOSIS — R29.898 LEFT ARM WEAKNESS: Primary | ICD-10-CM

## 2025-08-04 RX ORDER — METOPROLOL SUCCINATE 25 MG/1
25 TABLET, EXTENDED RELEASE ORAL DAILY
Qty: 90 TABLET | Refills: 1 | Status: SHIPPED | OUTPATIENT
Start: 2025-08-04

## 2025-08-04 RX ORDER — PREDNISONE 10 MG/1
TABLET ORAL
Qty: 42 TABLET | Refills: 0 | Status: SHIPPED | OUTPATIENT
Start: 2025-08-04

## 2025-08-12 ENCOUNTER — HOSPITAL ENCOUNTER (OUTPATIENT)
Facility: HOSPITAL | Age: 40
Discharge: HOME | End: 2025-08-15
Payer: OTHER GOVERNMENT

## 2025-08-12 DIAGNOSIS — R29.898 LEFT ARM WEAKNESS: ICD-10-CM

## 2025-08-12 DIAGNOSIS — M79.602 LEFT ARM PAIN: ICD-10-CM

## 2025-08-12 DIAGNOSIS — R20.0 LEFT ARM NUMBNESS: ICD-10-CM

## 2025-08-12 DIAGNOSIS — M48.02 CERVICAL STENOSIS OF SPINAL CANAL: ICD-10-CM

## 2025-08-12 PROCEDURE — 72141 MRI NECK SPINE W/O DYE: CPT

## 2025-08-13 ENCOUNTER — PATIENT MESSAGE (OUTPATIENT)
Age: 40
End: 2025-08-13

## 2025-08-13 DIAGNOSIS — G43.709 CHRONIC MIGRAINE WITHOUT AURA WITHOUT STATUS MIGRAINOSUS, NOT INTRACTABLE: ICD-10-CM

## 2025-08-13 DIAGNOSIS — M48.02 CERVICAL STENOSIS OF SPINAL CANAL: Primary | ICD-10-CM

## 2025-08-16 DIAGNOSIS — G43.709 CHRONIC MIGRAINE WITHOUT AURA WITHOUT STATUS MIGRAINOSUS, NOT INTRACTABLE: ICD-10-CM

## 2025-08-17 RX ORDER — ERENUMAB-AOOE 140 MG/ML
INJECTION, SOLUTION SUBCUTANEOUS
Qty: 3 ML | Refills: 5 | OUTPATIENT
Start: 2025-08-17

## 2025-08-19 RX ORDER — GABAPENTIN 300 MG/1
300 CAPSULE ORAL 2 TIMES DAILY
Qty: 60 CAPSULE | Refills: 2 | Status: SHIPPED | OUTPATIENT
Start: 2025-08-19 | End: 2025-09-18